# Patient Record
Sex: MALE | Race: WHITE | NOT HISPANIC OR LATINO | Employment: FULL TIME | URBAN - METROPOLITAN AREA
[De-identification: names, ages, dates, MRNs, and addresses within clinical notes are randomized per-mention and may not be internally consistent; named-entity substitution may affect disease eponyms.]

---

## 2017-03-24 ENCOUNTER — OFFICE VISIT (OUTPATIENT)
Dept: INTERNAL MEDICINE | Facility: CLINIC | Age: 37
End: 2017-03-24

## 2017-03-24 VITALS
WEIGHT: 300 LBS | DIASTOLIC BLOOD PRESSURE: 80 MMHG | HEART RATE: 90 BPM | SYSTOLIC BLOOD PRESSURE: 128 MMHG | OXYGEN SATURATION: 96 % | BODY MASS INDEX: 47.09 KG/M2 | HEIGHT: 67 IN

## 2017-03-24 DIAGNOSIS — Z00.00 ANNUAL PHYSICAL EXAM: Primary | ICD-10-CM

## 2017-03-24 DIAGNOSIS — E66.01 MORBID OBESITY, UNSPECIFIED OBESITY TYPE (HCC): ICD-10-CM

## 2017-03-24 DIAGNOSIS — K62.5 RECTAL BLEEDING: ICD-10-CM

## 2017-03-24 DIAGNOSIS — R10.13 EPIGASTRIC PAIN: ICD-10-CM

## 2017-03-24 LAB — HEMOCCULT STL QL IA: NEGATIVE

## 2017-03-24 PROCEDURE — 93000 ELECTROCARDIOGRAM COMPLETE: CPT | Performed by: NURSE PRACTITIONER

## 2017-03-24 PROCEDURE — 82274 ASSAY TEST FOR BLOOD FECAL: CPT | Performed by: NURSE PRACTITIONER

## 2017-03-24 PROCEDURE — 99385 PREV VISIT NEW AGE 18-39: CPT | Performed by: NURSE PRACTITIONER

## 2017-03-24 NOTE — PATIENT INSTRUCTIONS
"Low-Fat Diet for Pancreatitis or Gallbladder Conditions  A low-fat diet can be helpful if you have pancreatitis or a gallbladder condition. With these conditions, your pancreas and gallbladder have trouble digesting fats. A healthy eating plan with less fat will help rest your pancreas and gallbladder and reduce your symptoms.  WHAT DO I NEED TO KNOW ABOUT THIS DIET?  · Eat a low-fat diet.    Reduce your fat intake to less than 20-30% of your total daily calories. This is less than 50-60 g of fat per day.    Remember that you need some fat in your diet. Ask your dietician what your daily goal should be.    Choose nonfat and low-fat healthy foods. Look for the words \"nonfat,\" \"low fat,\" or \"fat free.\"    As a guide, look on the label and choose foods with less than 3 g of fat per serving. Eat only one serving.  · Avoid alcohol.  · Do not smoke. If you need help quitting, talk with your health care provider.  · Eat small frequent meals instead of three large heavy meals.  WHAT FOODS CAN I EAT?  Grains  Include healthy grains and starches such as potatoes, wheat bread, fiber-rich cereal, and brown rice. Choose whole grain options whenever possible. In adults, whole grains should account for 45-65% of your daily calories.   Fruits and Vegetables  Eat plenty of fruits and vegetables. Fresh fruits and vegetables add fiber to your diet.  Meats and Other Protein Sources  Eat lean meat such as chicken and pork. Trim any fat off of meat before cooking it. Eggs, fish, and beans are other sources of protein. In adults, these foods should account for 10-35% of your daily calories.  Dairy  Choose low-fat milk and dairy options. Dairy includes fat and protein, as well as calcium.   Fats and Oils  Limit high-fat foods such as fried foods, sweets, baked goods, sugary drinks.   Other  Creamy sauces and condiments, such as mayonnaise, can add extra fat. Think about whether or not you need to use them, or use smaller amounts or low fat " options.  WHAT FOODS ARE NOT RECOMMENDED?  · High fat foods, such as:    Baked goods.    Ice cream.    [place] citizen toast.    Sweet rolls.    Pizza.    Cheese bread.    Foods covered with batter, butter, creamy sauces, or cheese.    Fried foods.    Sugary drinks and desserts.  · Foods that cause gas or bloating     This information is not intended to replace advice given to you by your health care provider. Make sure you discuss any questions you have with your health care provider.     Document Released: 12/23/2014 Document Reviewed: 12/23/2014  [employer] Interactive Patient Education ©2016 [employer]

## 2017-03-24 NOTE — PROGRESS NOTES
Subjective   Greg Adkins is a 37 y.o. male.     HPI Comments: Well Adult Physical   Patient here for a comprehensive physical exam.The patient reports problems - rectal bleeding and chest pain (epigastric area)    Do you take any herbs or supplements that were not prescribed by a doctor? no   Are you taking calcium supplements? no   Are you taking aspirin daily? no     History:  Patient receives prostate care outside our clinic  Date last prostate exam: n/a  Date last PSA: n/a    He is  (full time). 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 He does not have routine exercise regimen. He does walk a lot with work. He did recently join [employer]. His last dental exam several years ago. His last eye exam several years ago.     Rectal Bleeding   This is a chronic problem. The current episode started more than 1 year ago. The problem occurs intermittently. The problem has been waxing and waning (last 1-2 days ). Associated symptoms include arthralgias (bilateral knees, intermittent ) and chest pain. Pertinent negatives include no abdominal pain, chills, congestion, coughing, fatigue, fever, headaches, joint swelling, myalgias, nausea, neck pain, numbness, rash, sore throat, vomiting or weakness. Nothing aggravates the symptoms. He has tried nothing for the symptoms.   Chest Pain    This is a new problem. The current episode started more than 1 year ago. The problem occurs intermittently. The problem has been waxing and waning. The pain is present in the epigastric region. The pain is at a severity of 0/10 (worst 6/10). The patient is experiencing no pain. The quality of the pain is described as pressure. The pain does not radiate. Associated symptoms include dizziness. Pertinent negatives include no abdominal pain, back pain, cough, fever, headaches, malaise/fatigue, nausea, numbness, palpitations, shortness of breath, vomiting or weakness. The pain is aggravated by food. The treatment provided significant relief.  Risk factors include male gender and obesity.   His past medical history is significant for anxiety/panic attacks and hyperlipidemia.   Pertinent negatives for past medical history include no MI and no seizures.   His family medical history is significant for heart disease and stroke.   Pertinent negatives for family medical history include: no early MI and no sudden death.        The following portions of the patient's history were reviewed and updated as appropriate: allergies, current medications, past family history, past medical history, past social history, past surgical history and problem list.    Review of Systems   Constitutional: Negative for activity change, appetite change, chills, fatigue, fever, malaise/fatigue and unexpected weight change.        Hx of dyslexia    HENT: Positive for mouth sores. Negative for congestion, ear discharge, ear pain, hearing loss, nosebleeds, postnasal drip, rhinorrhea, sinus pressure, sneezing, sore throat, tinnitus and voice change.         Bleeding gums    Eyes: Positive for visual disturbance (wears glasses).   Respiratory: Negative for cough, chest tightness, shortness of breath and wheezing.    Cardiovascular: Positive for chest pain. Negative for palpitations and leg swelling.   Gastrointestinal: Positive for hematochezia. Negative for abdominal distention, abdominal pain, anal bleeding, blood in stool (rectal bleeding, intermittent when wiping ), constipation, diarrhea, nausea and vomiting.   Endocrine: Negative for cold intolerance, heat intolerance, polydipsia, polyphagia and polyuria.   Genitourinary: Negative for difficulty urinating, discharge, frequency, hematuria, penile pain, penile swelling, scrotal swelling, testicular pain and urgency.        Hx of kidney stones    Musculoskeletal: Positive for arthralgias (bilateral knees, intermittent ). Negative for back pain, gait problem, joint swelling, myalgias, neck pain and neck stiffness.   Skin: Negative for  color change, pallor and rash.        NEGATIVE BREAST MASS, BREAST PAIN, NIPPLE DISCHARGE, SKIN CHANGES   Neurological: Positive for dizziness and light-headedness (happened a few days ago, lasted about 4 hours, he fell asleep). Negative for tremors, seizures, syncope, speech difficulty, weakness, numbness and headaches.   Hematological: Negative for adenopathy. Does not bruise/bleed easily.   Psychiatric/Behavioral: Negative for confusion, decreased concentration, dysphoric mood, sleep disturbance and suicidal ideas. The patient is not nervous/anxious.        Objective   Physical Exam   Constitutional: He is oriented to person, place, and time. He appears well-developed.   HENT:   Head: Normocephalic.   Right Ear: Tympanic membrane, external ear and ear canal normal.   Left Ear: Tympanic membrane, external ear and ear canal normal.   Nose: Nose normal. Right sinus exhibits no maxillary sinus tenderness and no frontal sinus tenderness. Left sinus exhibits no maxillary sinus tenderness and no frontal sinus tenderness.   Mouth/Throat: Uvula is midline, oropharynx is clear and moist and mucous membranes are normal.   Eyes: EOM are normal. Pupils are equal, round, and reactive to light.   Neck: Normal range of motion. Carotid bruit is not present. No tracheal deviation present. No thyromegaly present.   Cardiovascular: Normal rate, regular rhythm, normal heart sounds, intact distal pulses and normal pulses.  Exam reveals no gallop and no friction rub.    No murmur heard.  Pulmonary/Chest: No respiratory distress. He has no wheezes. He has no rales. He exhibits no tenderness.   Abdominal: Soft. Bowel sounds are normal. He exhibits no distension. There is no tenderness. Hernia confirmed negative in the right inguinal area and confirmed negative in the left inguinal area.   Genitourinary: Prostate normal, testes normal and penis normal. Rectal exam shows external hemorrhoid. Rectal exam shows no tenderness and guaiac  negative stool.   Musculoskeletal: He exhibits no edema, tenderness or deformity.   (-) SLR    Lymphadenopathy:     He has no cervical adenopathy. No inguinal adenopathy noted on the right or left side.   Neurological: He is alert and oriented to person, place, and time. He displays normal reflexes. No cranial nerve deficit. He exhibits normal muscle tone. Coordination normal.   Reflex Scores:       Bicep reflexes are 2+ on the right side and 2+ on the left side.       Brachioradialis reflexes are 2+ on the right side and 2+ on the left side.       Patellar reflexes are 2+ on the right side and 2+ on the left side.  Skin: Skin is warm. No erythema. No pallor.   Psychiatric: He has a normal mood and affect. His behavior is normal. Judgment and thought content normal.       Assessment/Plan   Greg was seen today for annual exam, rectal bleeding and chest pain.    Diagnoses and all orders for this visit:    Annual physical exam  -     CBC Auto Differential; Future  -     Comprehensive Metabolic Panel; Future  -     Lipid Panel; Future  -     Urinalysis With / Microscopic If Indicated; Future    Rectal bleeding  Comments:  ? r/t hemorrhoids   Orders:  -     POC FECAL OCCULT BLOOD BY IMMUNOASSAY    Epigastric pain  Comments:  gerd diet; may take otc zantac; will check US   Orders:  -     ECG 12 Lead  -     US Gallbladder    Morbid obesity, unspecified obesity type  Comments:  diet and exercise    He will return for fasting labs.           ECG 12 Lead  Date/Time: 3/24/2017 12:26 PM  Performed by: DANIEL VERA  Authorized by: DANIEL VERA   Rhythm: sinus rhythm  Rate: normal  Conduction: conduction normal  ST Segments: ST segments normal  T Waves: T waves normal  QRS axis: normal  Clinical impression: normal ECG

## 2017-03-31 ENCOUNTER — HOSPITAL ENCOUNTER (OUTPATIENT)
Dept: ULTRASOUND IMAGING | Facility: HOSPITAL | Age: 37
Discharge: HOME | End: 2017-03-31
Admitting: NURSE PRACTITIONER

## 2017-03-31 ENCOUNTER — LAB (OUTPATIENT)
Dept: LAB | Facility: HOSPITAL | Age: 37
End: 2017-03-31

## 2017-03-31 DIAGNOSIS — Z00.00 ANNUAL PHYSICAL EXAM: ICD-10-CM

## 2017-03-31 LAB
ALBUMIN SERPL-MCNC: 4.5 G/DL (ref 3.5–5.2)
ALBUMIN/GLOB SERPL: 1.6 G/DL
ALP SERPL-CCNC: 69 U/L (ref 39–117)
ALT SERPL W P-5'-P-CCNC: 51 U/L (ref 1–41)
ANION GAP SERPL CALCULATED.3IONS-SCNC: 12.9 MMOL/L
AST SERPL-CCNC: 42 U/L (ref 1–40)
BASOPHILS # BLD AUTO: 0.02 10*3/MM3 (ref 0–0.2)
BASOPHILS NFR BLD AUTO: 0.3 % (ref 0–1.5)
BILIRUB SERPL-MCNC: 0.5 MG/DL (ref 0.1–1.2)
BILIRUB UR QL STRIP: NEGATIVE
BUN BLD-MCNC: 16 MG/DL (ref 6–20)
BUN/CREAT SERPL: 16.7 (ref 7–25)
CALCIUM SPEC-SCNC: 9.6 MG/DL (ref 8.6–10.5)
CHLORIDE SERPL-SCNC: 103 MMOL/L (ref 98–107)
CHOLEST SERPL-MCNC: 198 MG/DL (ref 0–200)
CLARITY UR: CLEAR
CO2 SERPL-SCNC: 27.1 MMOL/L (ref 22–29)
COLOR UR: YELLOW
CREAT BLD-MCNC: 0.96 MG/DL (ref 0.76–1.27)
DEPRECATED RDW RBC AUTO: 46.1 FL (ref 37–54)
EOSINOPHIL # BLD AUTO: 0.11 10*3/MM3 (ref 0–0.7)
EOSINOPHIL NFR BLD AUTO: 1.6 % (ref 0.3–6.2)
ERYTHROCYTE [DISTWIDTH] IN BLOOD BY AUTOMATED COUNT: 13.9 % (ref 11.5–14.5)
GFR SERPL CREATININE-BSD FRML MDRD: 87 ML/MIN/1.73
GLOBULIN UR ELPH-MCNC: 2.9 GM/DL
GLUCOSE BLD-MCNC: 95 MG/DL (ref 65–99)
GLUCOSE UR STRIP-MCNC: NEGATIVE MG/DL
HCT VFR BLD AUTO: 44 % (ref 40.4–52.2)
HDLC SERPL-MCNC: 40 MG/DL (ref 40–60)
HGB BLD-MCNC: 14.8 G/DL (ref 13.7–17.6)
HGB UR QL STRIP.AUTO: NEGATIVE
IMM GRANULOCYTES # BLD: 0.02 10*3/MM3 (ref 0–0.03)
IMM GRANULOCYTES NFR BLD: 0.3 % (ref 0–0.5)
KETONES UR QL STRIP: NEGATIVE
LDLC SERPL CALC-MCNC: 130 MG/DL (ref 0–100)
LDLC/HDLC SERPL: 3.26 {RATIO}
LEUKOCYTE ESTERASE UR QL STRIP.AUTO: NEGATIVE
LYMPHOCYTES # BLD AUTO: 1.76 10*3/MM3 (ref 0.9–4.8)
LYMPHOCYTES NFR BLD AUTO: 25.5 % (ref 19.6–45.3)
MCH RBC QN AUTO: 30.7 PG (ref 27–32.7)
MCHC RBC AUTO-ENTMCNC: 33.6 G/DL (ref 32.6–36.4)
MCV RBC AUTO: 91.3 FL (ref 79.8–96.2)
MONOCYTES # BLD AUTO: 0.64 10*3/MM3 (ref 0.2–1.2)
MONOCYTES NFR BLD AUTO: 9.3 % (ref 5–12)
NEUTROPHILS # BLD AUTO: 4.36 10*3/MM3 (ref 1.9–8.1)
NEUTROPHILS NFR BLD AUTO: 63 % (ref 42.7–76)
NITRITE UR QL STRIP: NEGATIVE
PH UR STRIP.AUTO: 6.5 [PH] (ref 5–8)
PLATELET # BLD AUTO: 198 10*3/MM3 (ref 140–500)
PMV BLD AUTO: 9.9 FL (ref 6–12)
POTASSIUM BLD-SCNC: 4.5 MMOL/L (ref 3.5–5.2)
PROT SERPL-MCNC: 7.4 G/DL (ref 6–8.5)
PROT UR QL STRIP: NEGATIVE
RBC # BLD AUTO: 4.82 10*6/MM3 (ref 4.6–6)
SODIUM BLD-SCNC: 143 MMOL/L (ref 136–145)
SP GR UR STRIP: 1.03 (ref 1–1.03)
TRIGL SERPL-MCNC: 139 MG/DL (ref 0–150)
UROBILINOGEN UR QL STRIP: NORMAL
VLDLC SERPL-MCNC: 27.8 MG/DL (ref 5–40)
WBC NRBC COR # BLD: 6.91 10*3/MM3 (ref 4.5–10.7)

## 2017-03-31 PROCEDURE — 76705 ECHO EXAM OF ABDOMEN: CPT

## 2017-03-31 PROCEDURE — 81003 URINALYSIS AUTO W/O SCOPE: CPT

## 2017-03-31 PROCEDURE — 80053 COMPREHEN METABOLIC PANEL: CPT

## 2017-03-31 PROCEDURE — 80061 LIPID PANEL: CPT

## 2017-03-31 PROCEDURE — 85025 COMPLETE CBC W/AUTO DIFF WBC: CPT

## 2017-03-31 PROCEDURE — 36415 COLL VENOUS BLD VENIPUNCTURE: CPT

## 2017-04-28 ENCOUNTER — TELEPHONE (OUTPATIENT)
Dept: INTERNAL MEDICINE | Facility: CLINIC | Age: 37
End: 2017-04-28

## 2017-04-28 ENCOUNTER — HOSPITAL ENCOUNTER (OUTPATIENT)
Dept: GENERAL RADIOLOGY | Facility: HOSPITAL | Age: 37
Discharge: HOME | End: 2017-04-28
Admitting: NURSE PRACTITIONER

## 2017-04-28 ENCOUNTER — OFFICE VISIT (OUTPATIENT)
Dept: INTERNAL MEDICINE | Facility: CLINIC | Age: 37
End: 2017-04-28

## 2017-04-28 VITALS
HEART RATE: 88 BPM | BODY MASS INDEX: 48.18 KG/M2 | SYSTOLIC BLOOD PRESSURE: 122 MMHG | HEIGHT: 67 IN | OXYGEN SATURATION: 95 % | WEIGHT: 307 LBS | DIASTOLIC BLOOD PRESSURE: 88 MMHG

## 2017-04-28 DIAGNOSIS — M54.12 CERVICAL RADICULAR PAIN: ICD-10-CM

## 2017-04-28 DIAGNOSIS — K21.00 GASTROESOPHAGEAL REFLUX DISEASE WITH ESOPHAGITIS: ICD-10-CM

## 2017-04-28 DIAGNOSIS — K62.5 RECTAL BLEEDING: Primary | ICD-10-CM

## 2017-04-28 PROCEDURE — 72050 X-RAY EXAM NECK SPINE 4/5VWS: CPT

## 2017-04-28 PROCEDURE — 99213 OFFICE O/P EST LOW 20 MIN: CPT | Performed by: NURSE PRACTITIONER

## 2017-04-28 NOTE — PROGRESS NOTES
Subjective   Greg Adkins is a 37 y.o. male.     Rectal Bleeding   This is a new problem. The problem has been resolved. Associated symptoms include numbness (intermittent in right hand, right middle finger ). Pertinent negatives include no abdominal pain, chest pain, coughing, fatigue, fever, nausea, neck pain or vomiting.   Abdominal Pain   This is a recurrent problem. The current episode started more than 1 month ago. The problem occurs intermittently. The problem has been gradually improving. The pain is located in the epigastric region. The pain is at a severity of 0/10. The patient is experiencing no pain. The quality of the pain is burning. The abdominal pain does not radiate. Associated symptoms include hematochezia. Pertinent negatives include no diarrhea, fever, nausea or vomiting. The pain is aggravated by eating. Relieved by: removing caffiene avoid spicy foods  He has tried proton pump inhibitors for the symptoms. The treatment provided moderate relief. Prior workup: mathieu riddle         The following portions of the patient's history were reviewed and updated as appropriate: allergies, current medications and problem list.    Review of Systems   Constitutional: Negative for activity change, appetite change, fatigue and fever.   Respiratory: Negative for cough.    Cardiovascular: Negative for chest pain.   Gastrointestinal: Positive for hematochezia. Negative for abdominal distention, abdominal pain, blood in stool (resolved ), diarrhea, nausea, rectal pain and vomiting.        C/o reflux    Musculoskeletal: Negative for neck pain and neck stiffness.   Neurological: Positive for numbness (intermittent in right hand, right middle finger ).       Objective   Physical Exam   Constitutional: He is oriented to person, place, and time. He appears well-developed and well-nourished.   HENT:   Head: Normocephalic.   Nose: Nose normal.   Neck: Full passive range of motion without pain. Muscular tenderness  (mild ) present. No spinous process tenderness present. Carotid bruit is not present. Normal range of motion present.   Cardiovascular: Regular rhythm and normal heart sounds.  Exam reveals no S3 and no S4.    No murmur heard.  Pulmonary/Chest: Effort normal and breath sounds normal. He has no decreased breath sounds. He has no wheezes. He has no rhonchi. He has no rales.   Musculoskeletal: He exhibits no edema.        Right shoulder: He exhibits tenderness (mild ). He exhibits normal range of motion.        Cervical back: He exhibits normal range of motion, no tenderness and no pain.   Negative phalen's test     Neurological: He is alert and oriented to person, place, and time. Gait normal.   Reflex Scores:       Bicep reflexes are 2+ on the right side and 2+ on the left side.       Brachioradialis reflexes are 2+ on the right side and 2+ on the left side.  Skin: Skin is warm and dry.   Psychiatric: He has a normal mood and affect.       Assessment/Plan   Greg was seen today for rectal bleeding and abdominal pain.    Diagnoses and all orders for this visit:    Rectal bleeding  Comments:  resolved     Gastroesophageal reflux disease with esophagitis  Comments:  may use TUMS or zantac prn; avoid spicy acidic foods; small frequent meals    Cervical radicular pain  Comments:  will check c-spine   Orders:  -     Cancel: XR Spine Cervical Complete 4 or 5 View  -     XR Spine Cervical Complete 4 or 5 View    I reviewed recent labs and us of gallbladder with patient. We discussed potential HIDA scan he would like to wait on that. He would like to continue with diet modification.

## 2017-04-28 NOTE — TELEPHONE ENCOUNTER
I called patient to inform him of xray results of c-spine (mild degenerative changes/ ? Spasm). He will return if worsening of symptoms, may need to consider MRI.

## 2017-10-27 ENCOUNTER — OFFICE VISIT (OUTPATIENT)
Dept: INTERNAL MEDICINE | Facility: CLINIC | Age: 37
End: 2017-10-27

## 2017-10-27 VITALS
WEIGHT: 301 LBS | DIASTOLIC BLOOD PRESSURE: 92 MMHG | BODY MASS INDEX: 47.14 KG/M2 | HEART RATE: 77 BPM | SYSTOLIC BLOOD PRESSURE: 136 MMHG | OXYGEN SATURATION: 97 %

## 2017-10-27 DIAGNOSIS — M54.12 CERVICAL RADICULAR PAIN: Primary | ICD-10-CM

## 2017-10-27 DIAGNOSIS — R20.0 NUMBNESS IN BOTH HANDS: ICD-10-CM

## 2017-10-27 DIAGNOSIS — R03.0 ELEVATED BLOOD PRESSURE READING: ICD-10-CM

## 2017-10-27 PROCEDURE — 99214 OFFICE O/P EST MOD 30 MIN: CPT | Performed by: NURSE PRACTITIONER

## 2017-10-27 NOTE — PATIENT INSTRUCTIONS
"DASH Eating Plan  DASH stands for \"Dietary Approaches to Stop Hypertension.\" The DASH eating plan is a healthy eating plan that has been shown to reduce high blood pressure (hypertension). Additional health benefits may include reducing the risk of type 2 diabetes mellitus, heart disease, and stroke. The DASH eating plan may also help with weight loss.  WHAT DO I NEED TO KNOW ABOUT THE DASH EATING PLAN?  For the DASH eating plan, you will follow these general guidelines:  · Choose foods with less than 150 milligrams of sodium per serving (as listed on the food label).  · Use salt-free seasonings or herbs instead of table salt or sea salt.  · Check with your health care provider or pharmacist before using salt substitutes.  · Eat lower-sodium products. These are often labeled as \"low-sodium\" or \"no salt added.\"  · Eat fresh foods. Avoid eating a lot of canned foods.  · Eat more vegetables, fruits, and low-fat dairy products.  · Choose whole grains. Look for the word \"whole\" as the first word in the ingredient list.  · Choose fish and skinless chicken or turkey more often than red meat. Limit fish, poultry, and meat to 6 oz (170 g) each day.  · Limit sweets, desserts, sugars, and sugary drinks.  · Choose heart-healthy fats.  · Eat more home-cooked food and less restaurant, buffet, and fast food.  · Limit fried foods.  · Do not hernandez foods. Cook foods using methods such as baking, boiling, grilling, and broiling instead.  · When eating at a restaurant, ask that your food be prepared with less salt, or no salt if possible.  WHAT FOODS CAN I EAT?  Seek help from a dietitian for individual calorie needs.  Grains  Whole grain or whole wheat bread. Brown rice. Whole grain or whole wheat pasta. Quinoa, bulgur, and whole grain cereals. Low-sodium cereals. Corn or whole wheat flour tortillas. Whole grain cornbread. Whole grain crackers. Low-sodium crackers.  Vegetables  Fresh or frozen vegetables (raw, steamed, roasted, or " grilled). Low-sodium or reduced-sodium tomato and vegetable juices. Low-sodium or reduced-sodium tomato sauce and paste. Low-sodium or reduced-sodium canned vegetables.   Fruits  All fresh, canned (in natural juice), or frozen fruits.  Meat and Other Protein Products  Ground beef (85% or leaner), grass-fed beef, or beef trimmed of fat. Skinless chicken or turkey. Ground chicken or turkey. Pork trimmed of fat. All fish and seafood. Eggs. Dried beans, peas, or lentils. Unsalted nuts and seeds. Unsalted canned beans.  Dairy  Low-fat dairy products, such as skim or 1% milk, 2% or reduced-fat cheeses, low-fat ricotta or cottage cheese, or plain low-fat yogurt. Low-sodium or reduced-sodium cheeses.  Fats and Oils  Tub margarines without trans fats. Light or reduced-fat mayonnaise and salad dressings (reduced sodium). Avocado. Safflower, olive, or canola oils. Natural peanut or almond butter.  Other  Unsalted popcorn and pretzels.  The items listed above may not be a complete list of recommended foods or beverages. Contact your dietitian for more options.  WHAT FOODS ARE NOT RECOMMENDED?  Grains  White bread. White pasta. White rice. Refined cornbread. Bagels and croissants. Crackers that contain trans fat.  Vegetables  Creamed or fried vegetables. Vegetables in a cheese sauce. Regular canned vegetables. Regular canned tomato sauce and paste. Regular tomato and vegetable juices.  Fruits  Canned fruit in light or heavy syrup. Fruit juice.  Meat and Other Protein Products  Fatty cuts of meat. Ribs, chicken wings, castro, sausage, bologna, salami, chitterlings, fatback, hot dogs, bratwurst, and packaged luncheon meats. Salted nuts and seeds. Canned beans with salt.  Dairy  Whole or 2% milk, cream, half-and-half, and cream cheese. Whole-fat or sweetened yogurt. Full-fat cheeses or blue cheese. Nondairy creamers and whipped toppings. Processed cheese, cheese spreads, or cheese curds.  Condiments  Onion and garlic salt, seasoned  salt, table salt, and sea salt. Canned and packaged gravies. Worcestershire sauce. Tartar sauce. Barbecue sauce. Teriyaki sauce. Soy sauce, including reduced sodium. Steak sauce. Fish sauce. Oyster sauce. Cocktail sauce. Horseradish. Ketchup and mustard. Meat flavorings and tenderizers. Bouillon cubes. Hot sauce. Tabasco sauce. Marinades. Taco seasonings. Relishes.  Fats and Oils  Butter, stick margarine, lard, shortening, ghee, and castro fat. Coconut, palm kernel, or palm oils. Regular salad dressings.  Other  Pickles and olives. Salted popcorn and pretzels.  The items listed above may not be a complete list of foods and beverages to avoid. Contact your dietitian for more information.  WHERE CAN I FIND MORE INFORMATION?  [employer] [place]: www.nhlbi.nih.gov/health/health-topics/topics/dash/     This information is not intended to replace advice given to you by your health care provider. Make sure you discuss any questions you have with your health care provider.     Document Released: 12/06/2012 Document Revised: 04/10/2017 Document Reviewed: 10/22/2014  [employer] Interactive Patient Education ©2017 [employer]

## 2017-10-27 NOTE — PROGRESS NOTES
Subjective   Greg Adkins is a 37 y.o. male.     HPI Comments: He reports his numbness and tingling in hands has gotten worst in the last month. He had cervical spine xray performed in 4/2017.      Neck Pain    This is a new problem. The pain is associated with nothing. The quality of the pain is described as stabbing. The pain is at a severity of 0/10. The patient is experiencing no pain. The symptoms are aggravated by twisting. Associated symptoms include numbness (in bilateral hands ). Pertinent negatives include no chest pain, fever, headaches, pain with swallowing, trouble swallowing or visual change.        The following portions of the patient's history were reviewed and updated as appropriate: allergies, current medications, past family history, past medical history, past social history, past surgical history and problem list.    Review of Systems   Constitutional: Negative for activity change, appetite change, fatigue and fever.   HENT: Negative for trouble swallowing.    Respiratory: Negative for cough, shortness of breath and wheezing.    Cardiovascular: Negative for chest pain, palpitations and leg swelling.   Musculoskeletal: Positive for neck pain (radiate to right hand , third digit ).   Neurological: Positive for numbness (in bilateral hands ). Negative for dizziness, tremors and headaches.       Objective   Physical Exam   Constitutional: He is oriented to person, place, and time. He appears well-developed and well-nourished.   HENT:   Head: Normocephalic.   Nose: Nose normal.   Neck: Full passive range of motion without pain. No spinous process tenderness and no muscular tenderness present. Carotid bruit is not present. No thyroid mass and no thyromegaly present.   Cardiovascular: Regular rhythm and normal heart sounds.  Exam reveals no S3 and no S4.    No murmur heard.  Repeat bp left arm 138/88  No pedal edema   Pulmonary/Chest: Effort normal and breath sounds normal. He has no decreased  breath sounds. He has no wheezes. He has no rhonchi. He has no rales.   Musculoskeletal: He exhibits no edema.        Right hand: He exhibits tenderness. He exhibits normal range of motion, normal capillary refill and no swelling. Normal sensation noted.        Left hand: He exhibits tenderness. He exhibits normal range of motion, normal capillary refill and no swelling. Normal sensation noted.   Neurological: He is alert and oriented to person, place, and time. Gait normal.   (+) phalen and tinel sign    Skin: Skin is warm and dry.   Psychiatric: He has a normal mood and affect.       Assessment/Plan   Greg was seen today for neck pain and numbness.    Diagnoses and all orders for this visit:    Cervical radicular pain  -     Ambulatory Referral to Hand Surgery  -     MRI Cervical Spine Without Contrast; Future    Numbness in both hands  Comments:  will obtain emg and refer  to hand specialist ; will have him apply splints; left worst than right  Orders:  -     EMG & Nerve Conduction Test; Future    Elevated blood pressure reading  Comments:  goal of bp 140/90; low salt diet and exercise     I reviewed recent c spine results with patient. Secondary to persistent hand pain/numbness will obtain further imaging and test and refer to hand specialist.  He will monitor blood pressure and bring readings to next office visit. He will work on low salt diet, exercise and weight loss.

## 2017-11-10 ENCOUNTER — HOSPITAL ENCOUNTER (OUTPATIENT)
Dept: INFUSION THERAPY | Facility: HOSPITAL | Age: 37
Discharge: HOME | End: 2017-11-10
Attending: PHYSICAL MEDICINE & REHABILITATION | Admitting: PHYSICAL MEDICINE & REHABILITATION

## 2017-11-10 DIAGNOSIS — R20.0 NUMBNESS IN BOTH HANDS: ICD-10-CM

## 2017-11-10 PROCEDURE — 95886 MUSC TEST DONE W/N TEST COMP: CPT

## 2017-11-10 PROCEDURE — 95909 NRV CNDJ TST 5-6 STUDIES: CPT

## 2017-11-10 NOTE — PROCEDURES
HISTORY:      The patient is a 37-year-old left hand dominant, non-diabetic male who presents with a complaint of numbness and tingling of both hands (left > right).  He states that he has been experiencing the symptoms for several years.  He gives a 2-month history of an increase in severity of symptoms.     The patient denies neck pain.          I.  NERVE CONDUCTION STUDIES:       The distal latency of the left median motor nerve is 6.4 ms (upper limit of normal 4.3 ms).  The amplitude of evoked response is 4.3 mV.  The conduction velocity is 42 m/sec.       The distal latency of left median sensory nerve at 14 cm is 4.8 ms (upper limit of normal 3.6 ms).  The amplitude of evoked response is 10 microvolts (normal > 20 microvolts).       The distal latency of the left median sensory at 10 cm is 4.2 ms; the distal latency of the left radial sensory nerve at 10 cm is 2.1 ms (normal difference less than or equal to 0.4 ms).       The distal latency of the right median motor nerve is 6.0 ms (upper limit of normal 4.3 ms).  The amplitude of evoked response is 4.3 mV.  The conduction velocity is 43 m/sec.       The distal latency of the right median sensory nerve at 14 cm is 4.7 ms (upper limit of normal 3.6 ms). The amplitude of evoked response is 13 microvolts (normal > 20 microvolts).       The distal latency of the right median sensory nerve at 10 cm is 4.0 ms; the distal latency of the right radial sensory nerve at 10 cm is 2.5 ms (normal difference less than or equal to 0.4 ms).       II.  ELECTROMYOGRAPHY:       Electromyography was performed on the following muscles of the left upper extremity using a monopolar needle: Deltoid, biceps, brachioradialis, flexor carpi radialis, flexor carpi ulnaris, and abductor pollicis brevis.      There were no changes in insertional activity. Denervation potentials were present in the left abductor pollicis brevis.       The motor unit action potentials were of normal height and  duration. The recruitment patterns were normal.       III.  IMPRESSION:      1. The study shows evidence of  moderate to severe entrapment of the median nerve at the carpal ligament bilaterally (left > right).    2. There is no electrophysiologic evidence of a cervical radiculopathy or generalized peripheral neuropathy.      Santa Le MD  11/10/2017

## 2017-11-11 ENCOUNTER — HOSPITAL ENCOUNTER (OUTPATIENT)
Dept: MRI IMAGING | Facility: HOSPITAL | Age: 37
Discharge: HOME | End: 2017-11-11
Admitting: NURSE PRACTITIONER

## 2017-11-11 DIAGNOSIS — M54.12 CERVICAL RADICULAR PAIN: ICD-10-CM

## 2017-11-11 PROCEDURE — 72141 MRI NECK SPINE W/O DYE: CPT

## 2017-11-16 ENCOUNTER — TELEPHONE (OUTPATIENT)
Dept: INTERNAL MEDICINE | Facility: CLINIC | Age: 37
End: 2017-11-16

## 2017-11-16 NOTE — TELEPHONE ENCOUNTER
Patient returned call and I informed him of emg study report (moderate to severe median entrapment of bilateral extremites, worst left). His cervical spine was normal. He is due to see Dr Mcmillan tomorrow. I informed him that I we have faxed his reports to his office today.

## 2017-11-16 NOTE — TELEPHONE ENCOUNTER
I called patient to discuss nerve conduction test and mri findings. There was no answers so I left a message for patient to return call.

## 2017-11-16 NOTE — TELEPHONE ENCOUNTER
----- Message from Rhiannon Unger sent at 11/16/2017  3:14 PM EST -----  Contact: pt - Alessandra - RE: return call  Pt calling and would like a return call regarding results. Could you please call pt to discuss? Please advise. Thanks        Pt : [phone]

## 2017-12-08 ENCOUNTER — HOSPITAL ENCOUNTER (OUTPATIENT)
Dept: CT IMAGING | Facility: HOSPITAL | Age: 37
Discharge: HOME | End: 2017-12-08

## 2017-12-08 ENCOUNTER — HOSPITAL ENCOUNTER (OUTPATIENT)
Dept: CARDIOLOGY | Facility: HOSPITAL | Age: 37
Discharge: HOME | End: 2017-12-08
Attending: PLASTIC SURGERY | Admitting: PLASTIC SURGERY

## 2017-12-08 ENCOUNTER — TRANSCRIBE ORDERS (OUTPATIENT)
Dept: ADMINISTRATIVE | Facility: HOSPITAL | Age: 37
End: 2017-12-08

## 2017-12-08 ENCOUNTER — OFFICE VISIT (OUTPATIENT)
Dept: INTERNAL MEDICINE | Facility: CLINIC | Age: 37
End: 2017-12-08

## 2017-12-08 VITALS
BODY MASS INDEX: 46.36 KG/M2 | HEART RATE: 99 BPM | OXYGEN SATURATION: 95 % | TEMPERATURE: 98 F | DIASTOLIC BLOOD PRESSURE: 80 MMHG | WEIGHT: 296 LBS | SYSTOLIC BLOOD PRESSURE: 138 MMHG

## 2017-12-08 DIAGNOSIS — Z01.818 PRE-OP TESTING: ICD-10-CM

## 2017-12-08 DIAGNOSIS — R10.32 LEFT LOWER QUADRANT PAIN: ICD-10-CM

## 2017-12-08 DIAGNOSIS — Z01.818 PRE-OP TESTING: Primary | ICD-10-CM

## 2017-12-08 DIAGNOSIS — R20.0 NUMBNESS IN BOTH HANDS: Primary | ICD-10-CM

## 2017-12-08 DIAGNOSIS — G56.02 CARPAL TUNNEL SYNDROME OF LEFT WRIST: ICD-10-CM

## 2017-12-08 DIAGNOSIS — G56.03 BILATERAL CARPAL TUNNEL SYNDROME: ICD-10-CM

## 2017-12-08 LAB
BACTERIA UR QL AUTO: ABNORMAL /HPF
BILIRUB UR QL STRIP: NEGATIVE
BUN SERPL-MCNC: 13 MG/DL (ref 6–20)
BUN/CREAT SERPL: 11.2 (ref 7–25)
CALCIUM SERPL-MCNC: 9.9 MG/DL (ref 8.6–10.5)
CHLORIDE SERPL-SCNC: 101 MMOL/L (ref 98–107)
CLARITY UR: CLEAR
CO2 SERPL-SCNC: 27.7 MMOL/L (ref 22–29)
COLOR UR: ABNORMAL
CREAT SERPL-MCNC: 1.16 MG/DL (ref 0.76–1.27)
DEPRECATED RDW RBC AUTO: 44.1 FL (ref 37–54)
ERYTHROCYTE [DISTWIDTH] IN BLOOD BY AUTOMATED COUNT: 13.9 % (ref 11.5–15)
GLUCOSE SERPL-MCNC: 102 MG/DL (ref 65–99)
GLUCOSE UR STRIP-MCNC: NEGATIVE MG/DL
HCT VFR BLD AUTO: 44.3 % (ref 40.1–51)
HGB BLD-MCNC: 15.4 G/DL (ref 13.7–17.5)
HGB UR QL STRIP.AUTO: ABNORMAL
HYALINE CASTS UR QL AUTO: ABNORMAL /LPF
KETONES UR QL STRIP: NEGATIVE
LEUKOCYTE ESTERASE UR QL STRIP.AUTO: NEGATIVE
MCH RBC QN AUTO: 31.2 PG (ref 26–34)
MCHC RBC AUTO-ENTMCNC: 34.8 G/DL (ref 31–37)
MCV RBC AUTO: 89.7 FL (ref 80–100)
MUCOUS THREADS URNS QL MICRO: ABNORMAL /HPF
NITRITE UR QL STRIP: NEGATIVE
PH UR STRIP.AUTO: 6.5 [PH] (ref 5–8)
PLATELET # BLD AUTO: 225 10*3/MM3 (ref 150–450)
PMV BLD AUTO: 10 FL (ref 6–12)
POTASSIUM SERPL-SCNC: 4.2 MMOL/L (ref 3.5–5.2)
PROT UR QL STRIP: NEGATIVE
RBC # BLD AUTO: 4.94 10*6/MM3 (ref 4.63–6.08)
RBC # UR: ABNORMAL /HPF
REF LAB TEST METHOD: ABNORMAL
SODIUM SERPL-SCNC: 142 MMOL/L (ref 136–145)
SP GR UR STRIP: 1.02 (ref 1–1.03)
SQUAMOUS #/AREA URNS HPF: ABNORMAL /HPF
UROBILINOGEN UR QL STRIP: ABNORMAL
WBC NRBC COR # BLD: 10.4 10*3/MM3 (ref 5–10)
WBC UR QL AUTO: ABNORMAL /HPF

## 2017-12-08 PROCEDURE — 85027 COMPLETE CBC AUTOMATED: CPT | Performed by: NURSE PRACTITIONER

## 2017-12-08 PROCEDURE — 93010 ELECTROCARDIOGRAM REPORT: CPT | Performed by: INTERNAL MEDICINE

## 2017-12-08 PROCEDURE — 93005 ELECTROCARDIOGRAM TRACING: CPT | Performed by: PLASTIC SURGERY

## 2017-12-08 PROCEDURE — 81001 URINALYSIS AUTO W/SCOPE: CPT | Performed by: NURSE PRACTITIONER

## 2017-12-08 PROCEDURE — 74176 CT ABD & PELVIS W/O CONTRAST: CPT

## 2017-12-08 PROCEDURE — 99214 OFFICE O/P EST MOD 30 MIN: CPT | Performed by: NURSE PRACTITIONER

## 2017-12-08 NOTE — NURSING NOTE
Spoke with Dr. Obrien, ok'd for patient to be discharged home, informed patient that Alessandra BASS would be calling him this afternoon. Voiced understanding, home per self, no distress.

## 2017-12-08 NOTE — PROGRESS NOTES
Subjective   Greg Adkins is a 37 y.o. male.     HPI Comments: He has been seeing Dr Mcmillan for carpal tunnel of bilateral hands. He is due to have surgery on 12/21/2017.  He reports having left lower abdominal pain about 2-3 days ago. He did lift about 400 lbs of wire with assistance on Tuesday. He started with symptoms that night.     Neck Pain    This is a new problem. Associated symptoms include numbness (getting better ). Pertinent negatives include no chest pain or fever.   Abdominal Pain   This is a new problem. The current episode started in the past 7 days. The problem occurs intermittently. The problem has been gradually worsening. The pain is located in the LLQ. The pain is at a severity of 1/10. The pain is mild. The quality of the pain is cramping. The abdominal pain does not radiate. Pertinent negatives include no constipation, diarrhea, dysuria, fever, frequency, hematuria, nausea or vomiting. The pain is aggravated by coughing (full bladder ). The pain is relieved by urination. He has tried nothing for the symptoms. hx of kidney stones         The following portions of the patient's history were reviewed and updated as appropriate: allergies, current medications and problem list.    Review of Systems   Constitutional: Negative for activity change, appetite change, fatigue and fever.   Respiratory: Negative for cough, shortness of breath and wheezing.    Cardiovascular: Negative for chest pain, palpitations and leg swelling.   Gastrointestinal: Positive for abdominal pain. Negative for constipation, diarrhea, nausea and vomiting.   Genitourinary: Negative for dysuria, flank pain, frequency and hematuria.        No change in urinary stream    Musculoskeletal: Positive for neck pain. Negative for back pain.   Neurological: Positive for numbness (getting better ).       Objective   Physical Exam   Constitutional: He is oriented to person, place, and time. He appears well-developed and well-nourished.    HENT:   Head: Normocephalic.   Nose: Nose normal.   Cardiovascular: Regular rhythm and normal heart sounds.  Exam reveals no S3 and no S4.    No murmur heard.  Pulmonary/Chest: Effort normal and breath sounds normal. He has no decreased breath sounds. He has no wheezes. He has no rhonchi. He has no rales.   Abdominal: Bowel sounds are normal. There is no hepatosplenomegaly. There is tenderness in the left lower quadrant. No hernia.   Musculoskeletal: He exhibits no edema.   Neurological: He is alert and oriented to person, place, and time. Gait normal.   Skin: Skin is warm and dry.   Psychiatric: He has a normal mood and affect.       Assessment/Plan   Greg was seen today for neck pain, numbness and abdominal pain.    Diagnoses and all orders for this visit:    Numbness in both hands    Bilateral carpal tunnel syndrome  Comments:  due for surgery of left wrist on 12/21/2017 with Dr Mcmillan     Left lower quadrant pain  Comments:  No lifting/pushing or pulling   Orders:  -     Urinalysis With / Microscopic If Indicated - Urine, Clean Catch; Future  -     Urinalysis With / Microscopic If Indicated - Urine, Clean Catch  -     Urinalysis, Microscopic Only - Urine, Clean Catch; Future  -     Urinalysis, Microscopic Only - Urine, Clean Catch  -     CBC (No Diff); Future  -     Basic Metabolic Panel; Future  -     CT Abdomen Pelvis Stone Protocol; Future  -     CBC (No Diff)  -     Basic Metabolic Panel    Urine with trace blood. Will send for imaging secondary to history of kidney stones. Will check labs too.

## 2020-01-10 ENCOUNTER — OFFICE VISIT (OUTPATIENT)
Dept: INTERNAL MEDICINE | Facility: CLINIC | Age: 40
End: 2020-01-10

## 2020-01-10 VITALS
WEIGHT: 298.8 LBS | HEART RATE: 94 BPM | OXYGEN SATURATION: 98 % | BODY MASS INDEX: 45.28 KG/M2 | SYSTOLIC BLOOD PRESSURE: 134 MMHG | DIASTOLIC BLOOD PRESSURE: 86 MMHG | HEIGHT: 68 IN

## 2020-01-10 DIAGNOSIS — Z00.00 ANNUAL PHYSICAL EXAM: Primary | ICD-10-CM

## 2020-01-10 DIAGNOSIS — B35.1 ONYCHOMYCOSIS: ICD-10-CM

## 2020-01-10 DIAGNOSIS — F98.8 ATTENTION DEFICIT DISORDER (ADD) WITHOUT HYPERACTIVITY: ICD-10-CM

## 2020-01-10 PROCEDURE — 99396 PREV VISIT EST AGE 40-64: CPT | Performed by: NURSE PRACTITIONER

## 2020-01-10 PROCEDURE — 93000 ELECTROCARDIOGRAM COMPLETE: CPT | Performed by: NURSE PRACTITIONER

## 2020-01-10 RX ORDER — ATOMOXETINE 40 MG/1
40 CAPSULE ORAL DAILY
Qty: 30 CAPSULE | Refills: 1 | Status: SHIPPED | OUTPATIENT
Start: 2020-01-10 | End: 2020-02-07 | Stop reason: SDUPTHER

## 2020-01-10 NOTE — PATIENT INSTRUCTIONS
Health Maintenance, Male  A healthy lifestyle and preventive care is important for your health and wellness. Ask your health care provider about what schedule of regular examinations is right for you.  What should I know about weight and diet?  Eat a Healthy Diet  · Eat plenty of vegetables, fruits, whole grains, low-fat dairy products, and lean protein.  · Do not eat a lot of foods high in solid fats, added sugars, or salt.    Maintain a Healthy Weight  Regular exercise can help you achieve or maintain a healthy weight. You should:  · Do at least 150 minutes of exercise each week. The exercise should increase your heart rate and make you sweat (moderate-intensity exercise).  · Do strength-training exercises at least twice a week.  Watch Your Levels of Cholesterol and Blood Lipids  · Have your blood tested for lipids and cholesterol every 5 years starting at 35 years of age. If you are at high risk for heart disease, you should start having your blood tested when you are 20 years old. You may need to have your cholesterol levels checked more often if:  ? Your lipid or cholesterol levels are high.  ? You are older than 50 years of age.  ? You are at high risk for heart disease.  What should I know about cancer screening?  Many types of cancers can be detected early and may often be prevented.  Lung Cancer  · You should be screened every year for lung cancer if:  ? You are a current smoker who has smoked for at least 30 years.  ? You are a former smoker who has quit within the past 15 years.  · Talk to your health care provider about your screening options, when you should start screening, and how often you should be screened.  Colorectal Cancer  · Routine colorectal cancer screening usually begins at 50 years of age and should be repeated every 5-10 years until you are 75 years old. You may need to be screened more often if early forms of precancerous polyps or small growths are found. Your health care provider may  recommend screening at an earlier age if you have risk factors for colon cancer.  · Your health care provider may recommend using home test kits to check for hidden blood in the stool.  · A small camera at the end of a tube can be used to examine your colon (sigmoidoscopy or colonoscopy). This checks for the earliest forms of colorectal cancer.  Prostate and Testicular Cancer  · Depending on your age and overall health, your health care provider may do certain tests to screen for prostate and testicular cancer.  · Talk to your health care provider about any symptoms or concerns you have about testicular or prostate cancer.  Skin Cancer  · Check your skin from head to toe regularly.  · Tell your health care provider about any new moles or changes in moles, especially if:  ? There is a change in a mole’s size, shape, or color.  ? You have a mole that is larger than a pencil eraser.  · Always use sunscreen. Apply sunscreen liberally and repeat throughout the day.  · Protect yourself by wearing long sleeves, pants, a wide-brimmed hat, and sunglasses when outside.  What should I know about heart disease, diabetes, and high blood pressure?  · If you are 18-39 years of age, have your blood pressure checked every 3-5 years. If you are 40 years of age or older, have your blood pressure checked every year. You should have your blood pressure measured twice--once when you are at a hospital or clinic, and once when you are not at a hospital or clinic. Record the average of the two measurements. To check your blood pressure when you are not at a hospital or clinic, you can use:  ? An automated blood pressure machine at a pharmacy.  ? A home blood pressure monitor.  · Talk to your health care provider about your target blood pressure.  · If you are between 45-79 years old, ask your health care provider if you should take aspirin to prevent heart disease.  · Have regular diabetes screenings by checking your fasting blood sugar  level.  ? If you are at a normal weight and have a low risk for diabetes, have this test once every three years after the age of 45.  ? If you are overweight and have a high risk for diabetes, consider being tested at a younger age or more often.  · A one-time screening for abdominal aortic aneurysm (AAA) by ultrasound is recommended for men aged 65-75 years who are current or former smokers.  What should I know about preventing infection?  Hepatitis B  If you have a higher risk for hepatitis B, you should be screened for this virus. Talk with your health care provider to find out if you are at risk for hepatitis B infection.  Hepatitis C  Blood testing is recommended for:  · Everyone born from 1945 through 1965.  · Anyone with known risk factors for hepatitis C.  Sexually Transmitted Diseases (STDs)  · You should be screened each year for STDs including gonorrhea and chlamydia if:  ? You are sexually active and are younger than 24 years of age.  ? You are older than 24 years of age and your health care provider tells you that you are at risk for this type of infection.  ? Your sexual activity has changed since you were last screened and you are at an increased risk for chlamydia or gonorrhea. Ask your health care provider if you are at risk.  · Talk with your health care provider about whether you are at high risk of being infected with HIV. Your health care provider may recommend a prescription medicine to help prevent HIV infection.  What else can I do?  · Schedule regular health, dental, and eye exams.  · Stay current with your vaccines (immunizations).  · Do not use any tobacco products, such as cigarettes, chewing tobacco, and e-cigarettes. If you need help quitting, ask your health care provider.  · Limit alcohol intake to no more than 2 drinks per day. One drink equals 12 ounces of beer, 5 ounces of wine, or 1½ ounces of hard liquor.  · Do not use street drugs.  · Do not share needles.  · Ask your health  care provider for help if you need support or information about quitting drugs.  · Tell your health care provider if you often feel depressed.  · Tell your health care provider if you have ever been abused or do not feel safe at home.  This information is not intended to replace advice given to you by your health care provider. Make sure you discuss any questions you have with your health care provider.  Document Released: 06/15/2009 Document Revised: 08/16/2017 Document Reviewed: 09/20/2016  [employer] Interactive Patient Education © 2019 [employer]      Fungal Nail Infection  A fungal nail infection is a common infection of the toenails or fingernails. This condition affects toenails more often than fingernails. It often affects the great, or big, toes. More than one nail may be infected. The condition can be passed from person to person (is contagious).  What are the causes?  This condition is caused by a fungus. Several types of fungi can cause the infection. These fungi are common in moist and warm areas. If your hands or feet come into contact with the fungus, it may get into a crack in your fingernail or toenail and cause the infection.  What increases the risk?  The following factors may make you more likely to develop this condition:  · Being male.  · Being of older age.  · Living with someone who has the fungus.  · Walking barefoot in areas where the fungus thrives, such as showers or locker rooms.  · Wearing shoes and socks that cause your feet to sweat.  · Having a nail injury or a recent nail surgery.  · Having certain medical conditions, such as:  ? Athlete's foot.  ? Diabetes.  ? Psoriasis.  ? Poor circulation.  ? A weak body defense system (immune system).  What are the signs or symptoms?  Symptoms of this condition include:  · A pale spot on the nail.  · Thickening of the nail.  · A nail that becomes yellow or brown.  · A brittle or ragged nail edge.  · A crumbling nail.  · A nail that has lifted  away from the nail bed.  How is this diagnosed?  This condition is diagnosed with a physical exam. Your health care provider may take a scraping or clipping from your nail to test for the fungus.  How is this treated?  Treatment is not needed for mild infections. If you have significant nail changes, treatment may include:  · Antifungal medicines taken by mouth (orally). You may need to take the medicine for several weeks or several months, and you may not see the results for a long time. These medicines can cause side effects. Ask your health care provider what problems to watch for.  · Antifungal nail polish or nail cream. These may be used along with oral antifungal medicines.  · Laser treatment of the nail.  · Surgery to remove the nail. This may be needed for the most severe infections.  It can take a long time, usually up to a year, for the infection to go away. The infection may also come back.  Follow these instructions at home:  Medicines  · Take or apply over-the-counter and prescription medicines only as told by your health care provider.  · Ask your health care provider about using over-the-counter mentholated ointment on your nails.  Nail care  · Trim your nails often.  · Wash and dry your hands and feet every day.  · Keep your feet dry:  ? Wear absorbent socks, and change your socks frequently.  ? Wear shoes that allow air to circulate, such as sandals or canvas tennis shoes. Throw out old shoes.  · Do not use artificial nails.  · If you go to a nail salon, make sure you choose one that uses clean instruments.  · Use antifungal foot powder on your feet and in your shoes.  General instructions  · Do not share personal items, such as towels or nail clippers.  · Do not walk barefoot in shower rooms or locker rooms.  · Wear rubber gloves if you are working with your hands in wet areas.  · Keep all follow-up visits as told by your health care provider. This is important.  Contact a health care provider  if:  Your infection is not getting better or it is getting worse after several months.  Summary  · A fungal nail infection is a common infection of the toenails or fingernails.  · Treatment is not needed for mild infections. If you have significant nail changes, treatment may include taking medicine orally and applying medicine to your nails.  · It can take a long time, usually up to a year, for the infection to go away. The infection may also come back.  · Take or apply over-the-counter and prescription medicines only as told by your health care provider.  · Follow instructions for taking care of your nails to help prevent infection from coming back or spreading.  This information is not intended to replace advice given to you by your health care provider. Make sure you discuss any questions you have with your health care provider.  Document Released: 12/15/2001 Document Revised: 05/24/2019 Document Reviewed: 05/24/2019  [employer] Interactive Patient Education © 2019 [employer]

## 2020-01-10 NOTE — PROGRESS NOTES
Subjective   Greg Adkins is a 39 y.o. male.     .Well Adult Physical   Patient here for a comprehensive physical exam.The patient reports problems - concentration     Do you take any herbs or supplements that were not prescribed by a doctor? no   Are you taking calcium supplements? no   Are you taking aspirin daily? no     History:  Patient receives prostate care outside our clinic  Date last prostate exam: n/a  Date last PSA: n/a       He remains working full time. Overall doing well. He is busy with construction. He is up to date with vision and dental exam.     He has history dyslexia. Also history of attention deficit disorder. In childhood he was on ritalin and at some point strattera. He reports in last couple year his ability to focus has worsened. He has a lot of unfinished task and would like to restart strattera if possible.       He also makes mention of toenail fungus on bilateral toenails. This has been going on for 6 years. He has been doing salt water soaks intermittently with little help.         The following portions of the patient's history were reviewed and updated as appropriate: allergies, current medications, past family history, past medical history, past social history, past surgical history and problem list.    Review of Systems   Constitutional: Positive for fatigue. Negative for activity change, appetite change, chills, fever and unexpected weight change.        Overall doing well   HENT: Negative for congestion, ear discharge, ear pain, hearing loss, mouth sores, nosebleeds, postnasal drip, rhinorrhea, sinus pressure, sneezing, sore throat, tinnitus and voice change.    Eyes: Negative for visual disturbance.   Respiratory: Negative for cough, chest tightness, shortness of breath and wheezing.    Cardiovascular: Negative for chest pain, palpitations and leg swelling.   Gastrointestinal: Negative for abdominal distention, abdominal pain, anal bleeding, blood in stool, constipation,  diarrhea, nausea and vomiting.   Endocrine: Negative for cold intolerance, heat intolerance, polydipsia, polyphagia and polyuria.   Genitourinary: Negative for difficulty urinating, discharge, frequency, hematuria, penile pain, penile swelling, scrotal swelling, testicular pain and urgency.   Musculoskeletal: Negative for arthralgias, back pain, gait problem, joint swelling, myalgias, neck pain and neck stiffness.   Skin: Negative for color change, pallor and rash.        NEGATIVE BREAST MASS, BREAST PAIN, NIPPLE DISCHARGE, SKIN CHANGES   Neurological: Negative for dizziness, tremors, seizures, syncope, speech difficulty, weakness, light-headedness, numbness and headaches.   Hematological: Negative for adenopathy. Does not bruise/bleed easily.   Psychiatric/Behavioral: Positive for decreased concentration. Negative for confusion, dysphoric mood, sleep disturbance and suicidal ideas. The patient is not nervous/anxious.        Objective   Physical Exam   Constitutional: He is oriented to person, place, and time. He appears well-developed and well-nourished. No distress.   HENT:   Head: Normocephalic and atraumatic.   Right Ear: External ear normal.   Left Ear: External ear normal.   Nose: Nose normal.   Mouth/Throat: Oropharynx is clear and moist.   Eyes: Pupils are equal, round, and reactive to light. Conjunctivae and EOM are normal. Right eye exhibits no discharge. Left eye exhibits no discharge. No scleral icterus.   Neck: Normal range of motion. Neck supple. No JVD present. No tracheal deviation present. No thyromegaly present.   Cardiovascular: Normal rate, regular rhythm, normal heart sounds and intact distal pulses. Exam reveals no gallop and no friction rub.   No murmur heard.  Pulmonary/Chest: Effort normal and breath sounds normal. No respiratory distress. He has no wheezes. He has no rales. He exhibits no tenderness.   Abdominal: Soft. Bowel sounds are normal. He exhibits no distension and no mass. There is  no tenderness. There is no rebound and no guarding. No hernia.   Genitourinary: Rectum normal, prostate normal and penis normal. Rectal exam shows guaiac negative stool.   Musculoskeletal: Normal range of motion. He exhibits no edema.   Lymphadenopathy:     He has no cervical adenopathy.   Neurological: He is alert and oriented to person, place, and time. He has normal reflexes. No cranial nerve deficit. He exhibits normal muscle tone. Coordination normal.   Skin: Skin is warm and dry. No rash noted. No erythema.   Psychiatric: He has a normal mood and affect. His behavior is normal. Judgment and thought content normal.   Vitals reviewed.      Assessment/Plan   Greg was seen today for annual exam.    Diagnoses and all orders for this visit:    Annual physical exam  -     Comprehensive Metabolic Panel; Future  -     Lipid Panel; Future  -     TSH Rfx On Abnormal To Free T4; Future  -     CBC No Differential; Future  -     ECG 12 Lead    Attention deficit disorder (ADD) without hyperactivity  Comments:  will try strattera   Orders:  -     atomoxetine (STRATTERA) 40 MG capsule; Take 1 capsule by mouth Daily.  -     Vitamin B12; Future    Onychomycosis  Comments:  discussed keeping toes dry, may use otc lamisil, wool socks      ECG 12 Lead  Date/Time: 1/10/2020 11:56 AM  Performed by: Alessandra Spears APRN  Authorized by: Alessandra Spears APRN   Comparison: compared with previous ECG from 12/8/2017  Similar to previous ECG  Rhythm: sinus rhythm  Rate: normal  Conduction: conduction normal  ST Segments: ST segments normal  T Waves: T waves normal  QRS axis: normal    Clinical impression: normal ECG          He will return for lab work and then decision will be made on medication for toenail fungus.  He was advised to cardio exercise 30-45 minutes 3-4 times week along with weight loss (decreased portion control and increased fruits/vegatable and leaner meats).

## 2020-01-14 ENCOUNTER — LAB (OUTPATIENT)
Dept: INTERNAL MEDICINE | Facility: CLINIC | Age: 40
End: 2020-01-14

## 2020-01-14 DIAGNOSIS — Z00.00 ANNUAL PHYSICAL EXAM: ICD-10-CM

## 2020-01-14 DIAGNOSIS — F98.8 ATTENTION DEFICIT DISORDER (ADD) WITHOUT HYPERACTIVITY: ICD-10-CM

## 2020-01-14 LAB
CHOLEST SERPL-MCNC: 181 MG/DL (ref 0–200)
ERYTHROCYTE [DISTWIDTH] IN BLOOD BY AUTOMATED COUNT: 13 % (ref 12.3–15.4)
HCT VFR BLD AUTO: 41.7 % (ref 37.5–51)
HDLC SERPL-MCNC: 45 MG/DL (ref 40–60)
HGB BLD-MCNC: 14.6 G/DL (ref 13–17.7)
LDLC SERPL CALC-MCNC: 117 MG/DL (ref 0–100)
MCH RBC QN AUTO: 30.7 PG (ref 26.6–33)
MCHC RBC AUTO-ENTMCNC: 35 G/DL (ref 31.5–35.7)
MCV RBC AUTO: 87.6 FL (ref 79–97)
PLATELET # BLD AUTO: 238 10*3/MM3 (ref 140–450)
RBC # BLD AUTO: 4.76 10*6/MM3 (ref 4.14–5.8)
TRIGL SERPL-MCNC: 93 MG/DL (ref 0–150)
VLDLC SERPL-MCNC: 18.6 MG/DL
WBC # BLD AUTO: 8.75 10*3/MM3 (ref 3.4–10.8)

## 2020-01-15 ENCOUNTER — TELEPHONE (OUTPATIENT)
Dept: INTERNAL MEDICINE | Facility: CLINIC | Age: 40
End: 2020-01-15

## 2020-01-15 DIAGNOSIS — E03.9 ACQUIRED HYPOTHYROIDISM: Primary | ICD-10-CM

## 2020-01-15 LAB
T4 FREE SERPL-MCNC: 0.78 NG/DL (ref 0.93–1.7)
TSH SERPL-ACNC: 10.6 UIU/ML (ref 0.27–4.2)
VIT B12 SERPL-MCNC: 569 PG/ML (ref 211–946)

## 2020-01-15 RX ORDER — LEVOTHYROXINE SODIUM 0.03 MG/1
25 TABLET ORAL DAILY
Qty: 30 TABLET | Refills: 2 | Status: SHIPPED | OUTPATIENT
Start: 2020-01-15 | End: 2020-03-20 | Stop reason: SDUPTHER

## 2020-01-15 NOTE — TELEPHONE ENCOUNTER
I called patient to discuss his TSH levels. There was no answer so I left a message for him to return call. I did send message via labs.

## 2020-01-15 NOTE — TELEPHONE ENCOUNTER
Detailed message left on v/m of providers notes. He will need to call back to schedule a lab appointment for 6-8 weeks.

## 2020-01-15 NOTE — TELEPHONE ENCOUNTER
Mr. Adkins returned your call and I read him your notes on his recent labs.    He is agreeable to starting synthroid (levothyroxine) per your note. I've updated his new pharmacy in the chart to [facility].    Thank you,    Von

## 2020-01-15 NOTE — TELEPHONE ENCOUNTER
I have sent over levothyroxine 25 mcg daily. Please inform patient to take medication in the morning on empty stomach one hour prior to any other medication and/or food. He will need to return in 6-8 weeks for recheck of labs (non fasting). Please arrange. Thanks

## 2020-02-07 ENCOUNTER — OFFICE VISIT (OUTPATIENT)
Dept: INTERNAL MEDICINE | Facility: CLINIC | Age: 40
End: 2020-02-07

## 2020-02-07 VITALS
OXYGEN SATURATION: 99 % | WEIGHT: 289 LBS | BODY MASS INDEX: 43.8 KG/M2 | SYSTOLIC BLOOD PRESSURE: 128 MMHG | DIASTOLIC BLOOD PRESSURE: 86 MMHG | HEART RATE: 79 BPM | HEIGHT: 68 IN

## 2020-02-07 DIAGNOSIS — F98.8 ATTENTION DEFICIT DISORDER (ADD) WITHOUT HYPERACTIVITY: ICD-10-CM

## 2020-02-07 DIAGNOSIS — E03.9 ACQUIRED HYPOTHYROIDISM: Primary | ICD-10-CM

## 2020-02-07 LAB
ALBUMIN SERPL-MCNC: 4.5 G/DL (ref 3.5–5.2)
ALBUMIN/GLOB SERPL: 2 G/DL
ALP SERPL-CCNC: 61 U/L (ref 39–117)
ALT SERPL-CCNC: 31 U/L (ref 1–41)
AST SERPL-CCNC: 27 U/L (ref 1–40)
BILIRUB SERPL-MCNC: 0.7 MG/DL (ref 0.2–1.2)
BUN SERPL-MCNC: 13 MG/DL (ref 6–20)
BUN/CREAT SERPL: 14.4 (ref 7–25)
CALCIUM SERPL-MCNC: 9.4 MG/DL (ref 8.6–10.5)
CHLORIDE SERPL-SCNC: 104 MMOL/L (ref 98–107)
CO2 SERPL-SCNC: 24.7 MMOL/L (ref 22–29)
CREAT SERPL-MCNC: 0.9 MG/DL (ref 0.76–1.27)
GLOBULIN SER CALC-MCNC: 2.3 GM/DL
GLUCOSE SERPL-MCNC: 99 MG/DL (ref 65–99)
POTASSIUM SERPL-SCNC: 3.9 MMOL/L (ref 3.5–5.2)
PROT SERPL-MCNC: 6.8 G/DL (ref 6–8.5)
SODIUM SERPL-SCNC: 142 MMOL/L (ref 136–145)

## 2020-02-07 PROCEDURE — 99213 OFFICE O/P EST LOW 20 MIN: CPT | Performed by: NURSE PRACTITIONER

## 2020-02-07 RX ORDER — ATOMOXETINE 40 MG/1
40 CAPSULE ORAL DAILY
Qty: 30 CAPSULE | Refills: 4 | Status: SHIPPED | OUTPATIENT
Start: 2020-02-07 | End: 2020-04-07 | Stop reason: SDUPTHER

## 2020-02-07 NOTE — PATIENT INSTRUCTIONS
Hypothyroidism    Hypothyroidism is when the thyroid gland does not make enough of certain hormones (it is underactive). The thyroid gland is a small gland located in the lower front part of the neck, just in front of the windpipe (trachea). This gland makes hormones that help control how the body uses food for energy (metabolism) as well as how the heart and brain function. These hormones also play a role in keeping your bones strong. When the thyroid is underactive, it produces too little of the hormones thyroxine (T4) and triiodothyronine (T3).  What are the causes?  This condition may be caused by:  · Hashimoto's disease. This is a disease in which the body's disease-fighting system (immune system) attacks the thyroid gland. This is the most common cause.  · Viral infections.  · Pregnancy.  · Certain medicines.  · Birth defects.  · Past radiation treatments to the head or neck for cancer.  · Past treatment with radioactive iodine.  · Past exposure to radiation in the environment.  · Past surgical removal of part or all of the thyroid.  · Problems with a gland in the center of the brain (pituitary gland).  · Lack of enough iodine in the diet.  What increases the risk?  You are more likely to develop this condition if:  · You are female.  · You have a family history of thyroid conditions.  · You use a medicine called lithium.  · You take medicines that affect the immune system (immunosuppressants).  What are the signs or symptoms?  Symptoms of this condition include:  · Feeling as though you have no energy (lethargy).  · Not being able to tolerate cold.  · Weight gain that is not explained by a change in diet or exercise habits.  · Lack of appetite.  · Dry skin.  · Coarse hair.  · Menstrual irregularity.  · Slowing of thought processes.  · Constipation.  · Sadness or depression.  How is this diagnosed?  This condition may be diagnosed based on:  · Your symptoms, your medical history, and a physical exam.  · Blood  tests.  You may also have imaging tests, such as an ultrasound or MRI.  How is this treated?  This condition is treated with medicine that replaces the thyroid hormones that your body does not make. After you begin treatment, it may take several weeks for symptoms to go away.  Follow these instructions at home:  · Take over-the-counter and prescription medicines only as told by your health care provider.  · If you start taking any new medicines, tell your health care provider.  · Keep all follow-up visits as told by your health care provider. This is important.  ? As your condition improves, your dosage of thyroid hormone medicine may change.  ? You will need to have blood tests regularly so that your health care provider can monitor your condition.  Contact a health care provider if:  · Your symptoms do not get better with treatment.  · You are taking thyroid replacement medicine and you:  ? Sweat a lot.  ? Have tremors.  ? Feel anxious.  ? Lose weight rapidly.  ? Cannot tolerate heat.  ? Have emotional swings.  ? Have diarrhea.  ? Feel weak.  Get help right away if you have:  · Chest pain.  · An irregular heartbeat.  · A rapid heartbeat.  · Difficulty breathing.  Summary  · Hypothyroidism is when the thyroid gland does not make enough of certain hormones (it is underactive).  · When the thyroid is underactive, it produces too little of the hormones thyroxine (T4) and triiodothyronine (T3).  · The most common cause is Hashimoto's disease, a disease in which the body's disease-fighting system (immune system) attacks the thyroid gland. The condition can also be caused by viral infections, medicine, pregnancy, or past radiation treatment to the head or neck.  · Symptoms may include weight gain, dry skin, constipation, feeling as though you do not have energy, and not being able to tolerate cold.  · This condition is treated with medicine to replace the thyroid hormones that your body does not make.  This information  is not intended to replace advice given to you by your health care provider. Make sure you discuss any questions you have with your health care provider.  Document Released: 12/18/2006 Document Revised: 11/28/2018 Document Reviewed: 11/28/2018  [employer] Patient Education © 2019 [employer]

## 2020-02-07 NOTE — PROGRESS NOTES
Subjective   Greg Adkins is a 40 y.o. male.     He is here for follow up for ADD. He was started on Strattera and has noticed significant improvement with concentration. He denies any side effects.     He was also noted to have elevated TSH at last visit, however he did not start levothyroxine. He wanted to discuss today the medication and diagnosis in further details.     Hypothyroidism   This is a new problem. The current episode started more than 1 month ago. Associated symptoms include fatigue. Pertinent negatives include no chest pain, coughing, fever, headaches, swollen glands, urinary symptoms or weakness. He has tried nothing for the symptoms.        The following portions of the patient's history were reviewed and updated as appropriate: allergies, current medications, past family history, past medical history, past social history, past surgical history and problem list.    Review of Systems   Constitutional: Positive for fatigue. Negative for activity change, appetite change and fever.        Intentional weight loss    Respiratory: Negative for cough, shortness of breath and wheezing.    Cardiovascular: Negative for chest pain, palpitations and leg swelling.   Neurological: Positive for dizziness (once, resolved ). Negative for tremors, speech difficulty, weakness and headaches.   Psychiatric/Behavioral: Positive for decreased concentration. Negative for dysphoric mood, sleep disturbance and suicidal ideas. The patient is not nervous/anxious.        Objective   Physical Exam   Constitutional: He is oriented to person, place, and time. He appears well-developed and well-nourished.   HENT:   Head: Normocephalic.   Nose: Nose normal.   Cardiovascular: Regular rhythm and normal heart sounds. Exam reveals no S3 and no S4.   No murmur heard.  No pedaled edema    Pulmonary/Chest: Effort normal and breath sounds normal. He has no decreased breath sounds. He has no wheezes. He has no rhonchi. He has no rales.    Musculoskeletal: He exhibits no edema.   Neurological: He is alert and oriented to person, place, and time. Gait normal.   Skin: Skin is warm and dry.   Psychiatric: He has a normal mood and affect. His speech is normal and behavior is normal. Judgment and thought content normal. Cognition and memory are normal.       Assessment/Plan   Greg was seen today for hypothyroidism.    Diagnoses and all orders for this visit:    Acquired hypothyroidism  Comments:  will recheck levels today and start levothyroxine if remains elevated,   Orders:  -     T3, Free  -     T4, Free  -     TSH  -     Comprehensive Metabolic Panel    Attention deficit disorder (ADD) without hyperactivity  Comments:  doing well with straterra   Orders:  -     atomoxetine (STRATTERA) 40 MG capsule; Take 1 capsule by mouth Daily.    I reviewed recent lab work with patient.

## 2020-02-08 LAB
T3FREE SERPL-MCNC: 3.4 PG/ML (ref 2–4.4)
T4 FREE SERPL-MCNC: 0.82 NG/DL (ref 0.93–1.7)
TSH SERPL DL<=0.005 MIU/L-ACNC: 7.5 UIU/ML (ref 0.27–4.2)

## 2020-03-20 DIAGNOSIS — E03.9 ACQUIRED HYPOTHYROIDISM: ICD-10-CM

## 2020-03-20 RX ORDER — LEVOTHYROXINE SODIUM 0.03 MG/1
25 TABLET ORAL DAILY
Qty: 90 TABLET | Refills: 0 | Status: SHIPPED | OUTPATIENT
Start: 2020-03-20 | End: 2020-07-13

## 2020-04-07 DIAGNOSIS — F98.8 ATTENTION DEFICIT DISORDER (ADD) WITHOUT HYPERACTIVITY: ICD-10-CM

## 2020-04-07 RX ORDER — ATOMOXETINE 40 MG/1
40 CAPSULE ORAL DAILY
Qty: 90 CAPSULE | Refills: 0 | Status: SHIPPED | OUTPATIENT
Start: 2020-04-07 | End: 2020-07-13

## 2020-07-12 DIAGNOSIS — E03.9 ACQUIRED HYPOTHYROIDISM: ICD-10-CM

## 2020-07-12 DIAGNOSIS — F98.8 ATTENTION DEFICIT DISORDER (ADD) WITHOUT HYPERACTIVITY: ICD-10-CM

## 2020-07-13 RX ORDER — LEVOTHYROXINE SODIUM 0.03 MG/1
TABLET ORAL
Qty: 30 TABLET | Refills: 0 | Status: SHIPPED | OUTPATIENT
Start: 2020-07-13 | End: 2020-08-12

## 2020-07-13 RX ORDER — ATOMOXETINE 40 MG/1
CAPSULE ORAL
Qty: 30 CAPSULE | Refills: 0 | Status: SHIPPED | OUTPATIENT
Start: 2020-07-13 | End: 2021-01-29

## 2020-08-11 DIAGNOSIS — E03.9 ACQUIRED HYPOTHYROIDISM: ICD-10-CM

## 2020-08-12 RX ORDER — LEVOTHYROXINE SODIUM 0.03 MG/1
TABLET ORAL
Qty: 30 TABLET | Refills: 0 | Status: SHIPPED | OUTPATIENT
Start: 2020-08-12 | End: 2020-09-18

## 2020-09-17 DIAGNOSIS — E03.9 ACQUIRED HYPOTHYROIDISM: ICD-10-CM

## 2020-09-18 RX ORDER — LEVOTHYROXINE SODIUM 0.03 MG/1
TABLET ORAL
Qty: 30 TABLET | Refills: 0 | Status: SHIPPED | OUTPATIENT
Start: 2020-09-18 | End: 2020-10-19

## 2020-10-19 DIAGNOSIS — E03.9 ACQUIRED HYPOTHYROIDISM: ICD-10-CM

## 2020-10-19 RX ORDER — LEVOTHYROXINE SODIUM 0.03 MG/1
TABLET ORAL
Qty: 30 TABLET | Refills: 1 | Status: SHIPPED | OUTPATIENT
Start: 2020-10-19 | End: 2021-02-01

## 2021-01-29 ENCOUNTER — OFFICE VISIT (OUTPATIENT)
Dept: INTERNAL MEDICINE | Facility: CLINIC | Age: 41
End: 2021-01-29

## 2021-01-29 VITALS
TEMPERATURE: 98 F | SYSTOLIC BLOOD PRESSURE: 138 MMHG | OXYGEN SATURATION: 98 % | HEART RATE: 76 BPM | BODY MASS INDEX: 43.35 KG/M2 | WEIGHT: 286 LBS | DIASTOLIC BLOOD PRESSURE: 84 MMHG | HEIGHT: 68 IN

## 2021-01-29 DIAGNOSIS — H65.01 NON-RECURRENT ACUTE SEROUS OTITIS MEDIA OF RIGHT EAR: ICD-10-CM

## 2021-01-29 DIAGNOSIS — F98.8 ATTENTION DEFICIT DISORDER (ADD) WITHOUT HYPERACTIVITY: ICD-10-CM

## 2021-01-29 DIAGNOSIS — E03.9 ACQUIRED HYPOTHYROIDISM: Primary | ICD-10-CM

## 2021-01-29 DIAGNOSIS — Z23 NEED FOR INFLUENZA VACCINATION: ICD-10-CM

## 2021-01-29 LAB
ALBUMIN SERPL-MCNC: 4.8 G/DL (ref 3.5–5.2)
ALBUMIN/GLOB SERPL: 2.3 G/DL
ALP SERPL-CCNC: 74 U/L (ref 39–117)
ALT SERPL-CCNC: 29 U/L (ref 1–41)
AST SERPL-CCNC: 25 U/L (ref 1–40)
BILIRUB SERPL-MCNC: 0.6 MG/DL (ref 0–1.2)
BUN SERPL-MCNC: 16 MG/DL (ref 6–20)
BUN/CREAT SERPL: 14 (ref 7–25)
CALCIUM SERPL-MCNC: 9.9 MG/DL (ref 8.6–10.5)
CHLORIDE SERPL-SCNC: 102 MMOL/L (ref 98–107)
CHOLEST SERPL-MCNC: 204 MG/DL (ref 0–200)
CO2 SERPL-SCNC: 28.8 MMOL/L (ref 22–29)
CREAT SERPL-MCNC: 1.14 MG/DL (ref 0.76–1.27)
GLOBULIN SER CALC-MCNC: 2.1 GM/DL
GLUCOSE SERPL-MCNC: 93 MG/DL (ref 65–99)
HDLC SERPL-MCNC: 48 MG/DL (ref 40–60)
LDLC SERPL CALC-MCNC: 133 MG/DL (ref 0–100)
POTASSIUM SERPL-SCNC: 4.3 MMOL/L (ref 3.5–5.2)
PROT SERPL-MCNC: 6.9 G/DL (ref 6–8.5)
SODIUM SERPL-SCNC: 140 MMOL/L (ref 136–145)
T4 FREE SERPL-MCNC: 0.84 NG/DL (ref 0.93–1.7)
TRIGL SERPL-MCNC: 128 MG/DL (ref 0–150)
TSH SERPL DL<=0.005 MIU/L-ACNC: 8.46 UIU/ML (ref 0.27–4.2)
VLDLC SERPL CALC-MCNC: 23 MG/DL (ref 5–40)

## 2021-01-29 PROCEDURE — 90686 IIV4 VACC NO PRSV 0.5 ML IM: CPT | Performed by: NURSE PRACTITIONER

## 2021-01-29 PROCEDURE — 99213 OFFICE O/P EST LOW 20 MIN: CPT | Performed by: NURSE PRACTITIONER

## 2021-01-29 PROCEDURE — 90471 IMMUNIZATION ADMIN: CPT | Performed by: NURSE PRACTITIONER

## 2021-01-29 RX ORDER — AMOXICILLIN 500 MG/1
500 CAPSULE ORAL 2 TIMES DAILY
Qty: 14 CAPSULE | Refills: 0 | Status: SHIPPED | OUTPATIENT
Start: 2021-01-29 | End: 2021-02-05

## 2021-01-29 RX ORDER — ATOMOXETINE 40 MG/1
40 CAPSULE ORAL 2 TIMES DAILY
Qty: 60 CAPSULE | Refills: 3 | Status: SHIPPED | OUTPATIENT
Start: 2021-01-29 | End: 2022-08-08

## 2021-01-29 NOTE — PROGRESS NOTES
Subjective   Greg Adkins is a 41 y.o. male.     Overall doing ok. He reports that the strattera seems to be wearing off about mid day while he is at work, otherwise tolerating medication.  He does report having some left ear pain that started couple weeks ago. He denies any fever or chills.      Hypothyroidism  This is a new problem. The current episode started more than 1 month ago. Pertinent negatives include no chest pain, coughing, fatigue, fever or headaches. Treatments tried: levothyroxine         The following portions of the patient's history were reviewed and updated as appropriate: allergies, current medications, past family history, past medical history, past social history, past surgical history and problem list.    Review of Systems   Constitutional: Negative for activity change, appetite change, fatigue and fever.   HENT: Positive for ear pain (right ear).    Respiratory: Negative for cough, shortness of breath and wheezing.    Cardiovascular: Negative for chest pain, palpitations and leg swelling.   Neurological: Negative for dizziness and headaches.   Psychiatric/Behavioral: Positive for decreased concentration. Negative for confusion, dysphoric mood, hallucinations, sleep disturbance and suicidal ideas. The patient is not nervous/anxious.        Objective   Physical Exam  Constitutional:       Appearance: He is well-developed.   HENT:      Head: Normocephalic.      Right Ear: Hearing normal.      Left Ear: Hearing normal. No drainage or swelling. Tympanic membrane is erythematous and bulging.      Nose: Nose normal.   Neck:      Musculoskeletal: Full passive range of motion without pain.      Thyroid: No thyroid mass.      Vascular: No carotid bruit.   Cardiovascular:      Rate and Rhythm: Regular rhythm.      Heart sounds: Normal heart sounds. No murmur. No S3 or S4 sounds.    Pulmonary:      Effort: Pulmonary effort is normal.      Breath sounds: Normal breath sounds. No decreased breath  sounds, wheezing, rhonchi or rales.   Lymphadenopathy:      Head:      Right side of head: No submental, submandibular, tonsillar, preauricular, posterior auricular or occipital adenopathy.      Left side of head: No submental, submandibular, tonsillar, preauricular, posterior auricular or occipital adenopathy.   Skin:     General: Skin is warm and dry.   Neurological:      Mental Status: He is alert and oriented to person, place, and time.      Gait: Gait normal.   Psychiatric:         Mood and Affect: Mood and affect normal.         Speech: Speech normal.         Behavior: Behavior normal.         Thought Content: Thought content normal.         Cognition and Memory: Cognition and memory normal.         Judgment: Judgment normal.         Assessment/Plan   Diagnoses and all orders for this visit:    1. Acquired hypothyroidism (Primary)  Comments:  will check TSH; needs to take daily   Orders:  -     TSH Rfx On Abnormal To Free T4; Future  -     Comprehensive Metabolic Panel  -     Lipid Panel    2. Attention deficit disorder (ADD) without hyperactivity  Comments:  will increase strattera bid; if no improvement will refer to psych  Orders:  -     atomoxetine (STRATTERA) 40 MG capsule; Take 1 capsule by mouth 2 (Two) Times a Day.  Dispense: 60 capsule; Refill: 3    3. Non-recurrent acute serous otitis media of right ear  -     amoxicillin (AMOXIL) 500 MG capsule; Take 1 capsule by mouth 2 (Two) Times a Day for 7 days.  Dispense: 14 capsule; Refill: 0    4. Need for influenza vaccination  -     Fluarix/Fluzone/Afluria Quad>6 Months

## 2021-02-01 DIAGNOSIS — E03.9 ACQUIRED HYPOTHYROIDISM: ICD-10-CM

## 2021-02-01 RX ORDER — LEVOTHYROXINE SODIUM 0.05 MG/1
50 TABLET ORAL DAILY
Qty: 30 TABLET | Refills: 4 | Status: SHIPPED | OUTPATIENT
Start: 2021-02-01 | End: 2022-12-11 | Stop reason: SDUPTHER

## 2021-02-08 ENCOUNTER — TELEPHONE (OUTPATIENT)
Dept: INTERNAL MEDICINE | Facility: CLINIC | Age: 41
End: 2021-02-08

## 2021-02-08 NOTE — TELEPHONE ENCOUNTER
I spoke to Mr. Adkins and he stated he was exposed by his brother yesterday who recently tested positive. He states  he does not have any symptoms. He will go to [facility] with his fiancee to get tested.    I told him since he only had one day of exposure it may be too soon to test. He stated he understood and was fine with making an appointment for testing.

## 2021-02-08 NOTE — TELEPHONE ENCOUNTER
Caller: Greg Adkins    Relationship: Self    Best call back number: [phone]    What form or medical record are you requesting: LETTER STATING HE NEEDS TO BE TESTED FOR COVID SINCE HE HAS BEEN EXPOSED     Who is requesting this form or medical record from you: PATIENT PLACE OF EMPLOYMENT    How would you like to receive the form or medical records (pick-up, mail, fax): PATIENT WOULD LIKE AN EMAIL  IF POSSIBLE       Timeframe paperwork needed: SOMETIME THIS WEEK     Additional notes: PATIENT NEEDS A LETTER STATING HE HAS TO BE TESTED FOR COVID FOR HIS JOB. PLEASE ADVISE FOR FURTHER DETAILS.

## 2021-02-08 NOTE — TELEPHONE ENCOUNTER
Caller: Greg Adkins    Relationship to patient: Self    Best call back number: [phone]     Concerns or Questions if Applicable: PATIENT HAS BEEN EXPOSED TO COVID-19 AND HAS AN APPOINTMENT SCHEDULED FOR TOMORROW AT 1:00 P.M. WITH HIS PCP. PATIENT MUST HAVE THE COVID-19 TEST COMPLETED BEFORE HE CAN RETURN TO WORK.

## 2021-04-02 ENCOUNTER — BULK ORDERING (OUTPATIENT)
Dept: CASE MANAGEMENT | Facility: OTHER | Age: 41
End: 2021-04-02

## 2021-04-02 DIAGNOSIS — Z23 IMMUNIZATION DUE: ICD-10-CM

## 2022-01-07 ENCOUNTER — TELEMEDICINE (OUTPATIENT)
Dept: FAMILY MEDICINE CLINIC | Facility: TELEHEALTH | Age: 42
End: 2022-01-07

## 2022-01-07 VITALS — TEMPERATURE: 100 F

## 2022-01-07 DIAGNOSIS — Z20.822 SUSPECTED COVID-19 VIRUS INFECTION: Primary | ICD-10-CM

## 2022-01-07 DIAGNOSIS — J06.9 ACUTE URI: ICD-10-CM

## 2022-01-07 PROCEDURE — 99213 OFFICE O/P EST LOW 20 MIN: CPT | Performed by: NURSE PRACTITIONER

## 2022-01-07 RX ORDER — BROMPHENIRAMINE MALEATE, PSEUDOEPHEDRINE HYDROCHLORIDE, AND DEXTROMETHORPHAN HYDROBROMIDE 2; 30; 10 MG/5ML; MG/5ML; MG/5ML
5 SYRUP ORAL 4 TIMES DAILY PRN
Qty: 118 ML | Refills: 0 | Status: SHIPPED | OUTPATIENT
Start: 2022-01-07 | End: 2022-01-17

## 2022-01-07 NOTE — PATIENT INSTRUCTIONS
Order has been placed for SARS-CoV-2 (Coronavirus) test to be done at your nearest [facility]. You don't need to call the Urgent Care, just let them know when you arrive that you have been assessed by a Virtual Care Provider and that you have an order for testing. We will call with results when they are available. The results will be released to your [employer] cristino. A [employer] message will also be sent after the first attempted call to notify you that your test results are available. Continue to treat your symptoms just as you would for any viral illness. Take Tylenol and/or Ibuprofen for pain and/or fever, you may find it better to alternate these every 4 hours, so that you are only taking each every 8 hours. Stay hydrated, rest and you may treat cough with over-the-counter cough syrup such as Robitussin. You will need to Self Quarantine (instructions are being sent to [employer]) until the following criteria has been met:  --it has been 10 days from onset of symptoms  --minimum of 24 hours fever free without fever reducing medication  --AND improvement of symptoms  Continue to wear a mask for 14 days or until symptoms resolve. If symptoms worsen, go to Urgent Care or Emergency Department for care.  3 Key Steps to Take While Waiting for Your COVID-19 Test Result  To help stop the spread of COVID-19, take these 3 key steps NOW while waiting for your test results:  1. Stay home and monitor your health.  Stay home and monitor your health to help protect your friends, family, and others from possibly getting COVID-19 from you.  Stay home and away from others:  · If possible, stay away from others, especially people who are at higher risk for getting very sick from COVID-19, such as older adults and people with other medical conditions.  · If you have been in contact with someone with COVID-19, stay home and away from others for 14 days after your last contact with that person. Follow the recommendations of your  local public health department if you need to quarantine.  · If you have a fever, cough or other symptoms of COVID-19, stay home and away from others (except to get medical care).  Monitor your health:  · Watch for fever, cough, shortness of breath, or other symptoms of COVID-19. Remember, symptoms may appear 2-14 days after exposure to COVID-19 and can include:  ? Fever or chills  ? Cough  ? Shortness of breath or difficulty breathing  ? Tiredness  ? Muscle or body aches  ? Headache  ? New loss of taste or smell  ? Sore throat  ? Congestion or runny nose  ? Nausea or vomiting  ? Diarrhea  2. Think about the people you have recently been around.  If you are diagnosed with COVID-19, a public health worker may call you to check on your health, discuss who you have been around, and ask where you spent time while you may have been able to spread COVID-19 to others. While you wait for your COVID-19 test result, think about everyone you have been around recently. This will be important information to give health workers if your test is positive.   Complete the information on the back of this page to help you remember everyone you have been around.   3. Answer the phone call from the health department.  If a public health worker calls you, answer the call to help slow the spread of COVID-19 in your community.  · Discussions with health department staff are confidential. This means that your personal and medical information will be kept private and only shared with those who may need to know, like your health care provider.  · Your name will not be shared with those you came in contact with. The health department will only notify people you were in close contact with (within 6 feet for more than 15 minutes) that they might have been exposed to COVID-19.  Think about the people you have recently been around  If you test positive and are diagnosed with COVID-19, someone from the health department may call to check-in on your  health, discuss who you have been around, and ask where you spent time while you may have been able to spread COVID-19 to others. This form can help you think about people you have recently been around so you will be ready if a public health worker calls you.  Things to think about. Have you:  · Gone to work or school?  · Gotten together with others (eaten out at a restaurant, gone out for drinks, exercised with others or gone to a gym, had friends or family over to your house, volunteered, gone to a party, pool, or park)?  · Gone to a store in person (e.g., grocery store, mall)?  · Gone to in-person appointments (e.g., salon, davis, doctor's or dentist's office)?  · Ridden in a car with others (e.g., rideshare) or taken public transportation?  · Been inside a [employer], [employer], [employer] or other places of Buddhism?  Who lives with you?  · ______________________________________________________________________  · ______________________________________________________________________  · ______________________________________________________________________  · ______________________________________________________________________  Who have you been around (less than 6 feet for a total of 15 minutes or more) in the last 10 days? (You may have more people to list than the space provided. If so, write on the front of this sheet or a separate piece of paper.)  Name ______________________________________________  · Phone number ____________________________________  · Date you last saw them _____________________________  · Where you last saw them ________________________________________________  Name ______________________________________________  · Phone number ____________________________________  · Date you last saw them _____________________________  · Where you last saw them ________________________________________________  Name ______________________________________________  · Phone number  ____________________________________  · Date you last saw them _____________________________  · Where you last saw them ________________________________________________  Name ______________________________________________  · Phone number ____________________________________  · Date you last saw them _____________________________  · Where you last saw them ________________________________________________  Name ______________________________________________  · Phone number ____________________________________  · Date you last saw them _____________________________  · Where you last saw them ________________________________________________  What have you done in the last 10 days with other people?  Activity _____________________________________________  · Location _________________________________________  · Date ____________________________________________  Activity _____________________________________________  · Location _________________________________________  · Date ____________________________________________  Activity _____________________________________________  · Location _________________________________________  · Date ____________________________________________  Activity _____________________________________________  · Location _________________________________________  · Date ____________________________________________  Activity _____________________________________________  · Location _________________________________________  · Date ____________________________________________  Centers for Disease Control and Prevention  cdc.gov/coronavirus  07/09/2021  This information is not intended to replace advice given to you by your health care provider. Make sure you discuss any questions you have with your health care provider.  Document Revised: 07/14/2021 Document Reviewed: 07/14/2021  [employer] Patient Education © 2021 [employer] Inc.    10 Things You Can Do to Manage Your COVID-19 Symptoms at Home  If you  have possible or confirmed COVID-19:  1. Stay home except to get medical care.  2. Monitor your symptoms carefully. If your symptoms get worse, call your healthcare provider immediately.  3. Get rest and stay hydrated.  4. If you have a medical appointment, call the healthcare provider ahead of time and tell them that you have or may have COVID-19.  5. For medical emergencies, call 911 and notify the dispatch personnel that you have or may have COVID-19.  6. Cover your cough and sneezes with a tissue or use the inside of your elbow.  7. Wash your hands often with soap and water for at least 20 seconds or clean your hands with an alcohol-based hand  that contains at least 60% alcohol.  8. As much as possible, stay in a specific room and away from other people in your home. Also, you should use a separate bathroom, if available. If you need to be around other people in or outside of the home, wear a mask.  9. Avoid sharing personal items with other people in your household, like dishes, towels, and bedding.  10. Clean all surfaces that are touched often, like counters, tabletops, and doorknobs. Use household cleaning sprays or wipes according to the label instructions.  cdc.gov/coronavirus  07/16/2021  This information is not intended to replace advice given to you by your health care provider. Make sure you discuss any questions you have with your health care provider.  Document Revised: 08/04/2021 Document Reviewed: 08/04/2021  [employer] Patient Education © 2021 [employer].

## 2022-01-07 NOTE — PROGRESS NOTES
Mode of Visit: Video  Location of patient: home  You have chosen to receive care through a telehealth visit.  The patient has signed the video visit consent form.  The visit included audio and video interaction. No technical issues occurred during this visit.     Chief Complaint  Fever (low grade), Sore Throat, Cough, and Muscle Pain    Subjective          Greg Adkins presents to [facility]  Covid symptoms with onset today and no known exposure to illness, needs Covid test    Fever   This is a new problem. The current episode started today. The problem occurs intermittently. The maximum temperature noted was 100 to 100.9 F. Associated symptoms include congestion, coughing, headaches, muscle aches, nausea, a sore throat and vomiting. Pertinent negatives include no wheezing.   Sore Throat   This is a new problem. The current episode started today. The maximum temperature recorded prior to his arrival was 100.4 - 100.9 F. Associated symptoms include congestion, coughing, headaches, shortness of breath and vomiting. Pertinent negatives include no ear discharge, swollen glands or trouble swallowing.   Cough  This is a new problem. The current episode started today. The cough is non-productive. Associated symptoms include chills, a fever, headaches, myalgias, nasal congestion, postnasal drip, a sore throat, shortness of breath and sweats. Pertinent negatives include no wheezing. Risk factors for lung disease include smoking/tobacco exposure. There is no history of asthma, bronchitis or environmental allergies.   Muscle Pain  This is a new problem. The current episode started today. The problem occurs constantly. Associated symptoms include a fever, headaches, nausea, shortness of breath and vomiting. Pertinent negatives include no swollen glands or wheezing.     Objective   Vital Signs:   Temp 100 °F (37.8 °C)     Physical Exam   Constitutional: He appears well-developed and  well-nourished. He appears ill. No distress.   HENT:   Nose: Congestion present.   Eyes: Conjunctivae are normal.   Pulmonary/Chest: Effort normal.  No respiratory distress.  Neurological: He is alert.   Psychiatric: He has a normal mood and affect.     Result Review :                 Assessment and Plan    Diagnoses and all orders for this visit:    1. Suspected COVID-19 virus infection (Primary)  -     COVID-19,[facility] ROUTINE, NP/OP SWAB IN TRANSPORT MEDIA OR ESWAB 72 HR TAT - Swab, Anterior nasal; Future  -     QUESTIONNAIRE SERIES    2. Acute URI  -     brompheniramine-pseudoephedrine-DM 30-2-10 MG/5ML syrup; Take 5 mL by mouth 4 (Four) Times a Day As Needed for Congestion or Cough for up to 10 days.  Dispense: 118 mL; Refill: 0              I spent 20 minutes caring for Greg on this date of service. This time includes time spent by me in the following activities:preparing for the visit, obtaining and/or reviewing a separately obtained history, performing a medically appropriate examination and/or evaluation , counseling and educating the patient/family/caregiver, ordering medications, tests, or procedures, and documenting information in the medical record  Follow Up   Return if symptoms worsen or fail to improve.  Patient was given instructions and counseling regarding his condition or for health maintenance advice. Please see specific information pulled into the AVS if appropriate.

## 2022-08-08 ENCOUNTER — OFFICE VISIT (OUTPATIENT)
Dept: FAMILY MEDICINE CLINIC | Facility: CLINIC | Age: 42
End: 2022-08-08

## 2022-08-08 VITALS
OXYGEN SATURATION: 98 % | DIASTOLIC BLOOD PRESSURE: 88 MMHG | HEIGHT: 68 IN | HEART RATE: 71 BPM | WEIGHT: 270 LBS | BODY MASS INDEX: 40.92 KG/M2 | SYSTOLIC BLOOD PRESSURE: 136 MMHG | RESPIRATION RATE: 18 BRPM

## 2022-08-08 DIAGNOSIS — Z13.220 ENCOUNTER FOR LIPID SCREENING FOR CARDIOVASCULAR DISEASE: ICD-10-CM

## 2022-08-08 DIAGNOSIS — Z13.1 SCREENING FOR DIABETES MELLITUS: ICD-10-CM

## 2022-08-08 DIAGNOSIS — E03.9 ACQUIRED HYPOTHYROIDISM: ICD-10-CM

## 2022-08-08 DIAGNOSIS — Z11.59 ENCOUNTER FOR HEPATITIS C SCREENING TEST FOR LOW RISK PATIENT: ICD-10-CM

## 2022-08-08 DIAGNOSIS — Z00.00 ROUTINE HEALTH MAINTENANCE: Primary | ICD-10-CM

## 2022-08-08 DIAGNOSIS — Z13.6 ENCOUNTER FOR LIPID SCREENING FOR CARDIOVASCULAR DISEASE: ICD-10-CM

## 2022-08-08 PROCEDURE — 99396 PREV VISIT EST AGE 40-64: CPT | Performed by: FAMILY MEDICINE

## 2022-08-08 NOTE — PROGRESS NOTES
"Chief Complaint  Establish Care and Med Management    Subjective    History of Present Illness {CC  Problem List  Visit  Diagnosis   Encounters  Notes  Medications  Labs  Result Review Imaging  Media :23}     Greg Adkins presents to [facility] for Establish Care and Med Management.  History of Present Illness     Here today to establish care. Previously seen by Alessandra Spears.    Doing well overall. Due for an annual exam today. Due for a check of labs. Has established hypothyroidism on low-dose levothyroxine. Due for check of his thyroid. Also due for screening for diabetes and hyperlipidemia.    Diet has improved since his last visit, weight is down. Working on exercising more frequently as well. Happy with the progress that he has made.    Up-to-date with preventive health recommendations overall. Will be due for a COVID booster in the near future. Encouraged to get it at his local pharmacy.    Has good family and social support. Enjoys his work overall. Gets regular dental exams.    Objective     Vital Signs:   /88   Pulse 71   Resp 18   Ht 172.7 cm (68\")   Wt 122 kg (270 lb)   SpO2 98%   BMI 41.05 kg/m²   Physical Exam  Vitals and nursing note reviewed.   Constitutional:       General: He is not in acute distress.     Appearance: Normal appearance. He is not ill-appearing.   Cardiovascular:      Rate and Rhythm: Normal rate and regular rhythm.      Pulses: Normal pulses.      Heart sounds: Normal heart sounds. No murmur heard.  Pulmonary:      Effort: Pulmonary effort is normal. No respiratory distress.      Breath sounds: Normal breath sounds. No rales.   Neurological:      Mental Status: He is alert and oriented to person, place, and time. Mental status is at baseline.   Psychiatric:         Mood and Affect: Mood normal.         Behavior: Behavior normal.          Result Review  Data Reviewed:{ Labs  Result Review  Imaging  Med Tab  Media :23} "                   Assessment and Plan {CC Problem List  Visit Diagnosis  ROS  Review (Popup)  Health Maintenance  Quality  BestPractice  Medications  SmartSets  SnapShot Encounters  Media :23}   Diagnoses and all orders for this visit:    1. Routine health maintenance (Primary)    2. Acquired hypothyroidism  -     TSH Rfx On Abnormal To Free T4    3. Screening for diabetes mellitus  -     Comprehensive Metabolic Panel    4. Encounter for lipid screening for cardiovascular disease  -     Lipid Panel    5. Encounter for hepatitis C screening test for low risk patient  -     Hepatitis C Antibody    Orders as above. I will contact him with results as available. Encouraged to continue with healthy lifestyle choices, further work on diet and exercise will certainly pay off in the long run.    Recommended follow-up as below. Encouraged communication via [employer] in the meantime.    Patient was given instructions and counseling regarding his condition or for health maintenance advice. Please see specific information pulled into the AVS (placed there by myself) if appropriate.    Return in about 1 year (around 8/8/2023) for Preventive Health Maintenance.      BEBA Recio MD    Prevention counseling performed as below: Mindfulness for stress management.

## 2022-08-09 LAB
ALBUMIN SERPL-MCNC: 5 G/DL (ref 4–5)
ALBUMIN/GLOB SERPL: 2.3 {RATIO} (ref 1.2–2.2)
ALP SERPL-CCNC: 75 IU/L (ref 44–121)
ALT SERPL-CCNC: 27 IU/L (ref 0–44)
AST SERPL-CCNC: 23 IU/L (ref 0–40)
BILIRUB SERPL-MCNC: 0.9 MG/DL (ref 0–1.2)
BUN SERPL-MCNC: 12 MG/DL (ref 6–24)
BUN/CREAT SERPL: 11 (ref 9–20)
CALCIUM SERPL-MCNC: 10.7 MG/DL (ref 8.7–10.2)
CHLORIDE SERPL-SCNC: 103 MMOL/L (ref 96–106)
CHOLEST SERPL-MCNC: 189 MG/DL (ref 100–199)
CO2 SERPL-SCNC: 27 MMOL/L (ref 20–29)
CREAT SERPL-MCNC: 1.12 MG/DL (ref 0.76–1.27)
EGFRCR SERPLBLD CKD-EPI 2021: 83 ML/MIN/1.73
GLOBULIN SER CALC-MCNC: 2.2 G/DL (ref 1.5–4.5)
GLUCOSE SERPL-MCNC: 94 MG/DL (ref 65–99)
HCV AB S/CO SERPL IA: 0.1 S/CO RATIO (ref 0–0.9)
HDLC SERPL-MCNC: 46 MG/DL
LDLC SERPL CALC-MCNC: 119 MG/DL (ref 0–99)
POTASSIUM SERPL-SCNC: 5.1 MMOL/L (ref 3.5–5.2)
PROT SERPL-MCNC: 7.2 G/DL (ref 6–8.5)
SODIUM SERPL-SCNC: 143 MMOL/L (ref 134–144)
T4 FREE SERPL-MCNC: 0.98 NG/DL (ref 0.82–1.77)
TRIGL SERPL-MCNC: 137 MG/DL (ref 0–149)
TSH SERPL DL<=0.005 MIU/L-ACNC: 5.66 UIU/ML (ref 0.45–4.5)
VLDLC SERPL CALC-MCNC: 24 MG/DL (ref 5–40)

## 2022-08-12 ENCOUNTER — PATIENT ROUNDING (BHMG ONLY) (OUTPATIENT)
Dept: FAMILY MEDICINE CLINIC | Facility: CLINIC | Age: 42
End: 2022-08-12

## 2022-08-12 NOTE — PROGRESS NOTES
A RF Code message has been sent to the patient for PATIENT ROUNDING with [facility] [place].

## 2023-04-25 ENCOUNTER — OFFICE VISIT (OUTPATIENT)
Dept: FAMILY MEDICINE CLINIC | Facility: CLINIC | Age: 43
End: 2023-04-25
Payer: COMMERCIAL

## 2023-04-25 VITALS
BODY MASS INDEX: 43.8 KG/M2 | DIASTOLIC BLOOD PRESSURE: 102 MMHG | HEIGHT: 68 IN | OXYGEN SATURATION: 99 % | SYSTOLIC BLOOD PRESSURE: 132 MMHG | WEIGHT: 289 LBS | RESPIRATION RATE: 18 BRPM | HEART RATE: 71 BPM

## 2023-04-25 DIAGNOSIS — E66.01 CLASS 3 SEVERE OBESITY DUE TO EXCESS CALORIES WITHOUT SERIOUS COMORBIDITY WITH BODY MASS INDEX (BMI) OF 40.0 TO 44.9 IN ADULT: ICD-10-CM

## 2023-04-25 DIAGNOSIS — R68.82 DECREASED LIBIDO: ICD-10-CM

## 2023-04-25 DIAGNOSIS — R41.89 BRAIN FOG: ICD-10-CM

## 2023-04-25 DIAGNOSIS — R41.840 ATTENTION DEFICIT: ICD-10-CM

## 2023-04-25 DIAGNOSIS — E03.9 ACQUIRED HYPOTHYROIDISM: Primary | ICD-10-CM

## 2023-04-25 PROCEDURE — 99214 OFFICE O/P EST MOD 30 MIN: CPT | Performed by: FAMILY MEDICINE

## 2023-04-25 NOTE — PROGRESS NOTES
"Chief Complaint  ADHD (Pt requesting testing) and low testosterone    Subjective    History of Present Illness {CC  Problem List  Visit  Diagnosis   Encounters  Notes  Medications  Labs  Result Review Imaging  Media :23}     Greg Adkins presents to [facility] for ADHD (Pt requesting testing) and low testosterone.  History of Present Illness     Here today with concern as above. Feels that his thinking is very \"fuzzy\" and unclear. Isn't sure if this is related to low testosterone, or ADHD, but is interested in testing for both. Was diagnosed with dyslexia as a child, went to [employer] school. Feels that he has word finding difficulty and a hard time with thinking through tasks.    Has noticed a decrease in libido, which makes him believe he has low testosterone. No problems with erectile dysfunction. Has decreased energy though he realizes that this could be due to a number of issues.    Weight is high and has been so for a while now. Has not had any success with weight loss efforts. Would like a referral to nutrition if possible.    Has a history of hypothyroidism, not currently taking levothyroxine. Would like some labs to see whether or not he needs to take it going forward.    Objective     Vital Signs:   BP (!) 132/102   Pulse 71   Resp 18   Ht 172.7 cm (68\")   Wt 131 kg (289 lb)   SpO2 99%   BMI 43.94 kg/m²   Physical Exam  Vitals and nursing note reviewed.   Constitutional:       General: He is not in acute distress.     Appearance: Normal appearance. He is not ill-appearing.   Cardiovascular:      Rate and Rhythm: Normal rate and regular rhythm.      Pulses: Normal pulses.      Heart sounds: Normal heart sounds. No murmur heard.  Pulmonary:      Effort: Pulmonary effort is normal. No respiratory distress.      Breath sounds: Normal breath sounds. No rales.   Neurological:      Mental Status: He is alert and oriented to person, place, and time. Mental status is " at baseline.   Psychiatric:         Mood and Affect: Mood normal.         Behavior: Behavior normal.          Result Review  Data Reviewed:{ Labs  Result Review  Imaging  Med Tab  Media :23}                   Assessment and Plan {CC Problem List  Visit Diagnosis  ROS  Review (Popup)  Health Maintenance  Quality  BestPractice  Medications  SmartSets  SnapShot Encounters  Media :23}   Diagnoses and all orders for this visit:    1. Acquired hypothyroidism (Primary)  -     TSH Rfx On Abnormal To Free T4    2. Attention deficit  -     Ambulatory Referral to Neuropsychology    3. Class 3 severe obesity due to excess calories without serious comorbidity with body mass index (BMI) of 40.0 to 44.9 in adult  -     Comprehensive Metabolic Panel  -     Lipid Panel  -     Testosterone  -     Ambulatory Referral to Nutrition Services  -     Hemoglobin A1c    4. Brain fog  -     Comprehensive Metabolic Panel  -     Lipid Panel  -     Testosterone  -     Ambulatory Referral to Neuropsychology  -     TSH Rfx On Abnormal To Free T4    5. Decreased libido  -     Testosterone    Orders as above. I will contact him with results as available. Referral to nutrition and neuropsychology as discussed. I will contact him after these evaluations to discuss management options.    Discussed the interplay between obesity and the various complaints. Strongly encouraged him to continue working on a healthier lifestyle habits as this will likely help significantly.    Recommended follow-up as below. Encouraged communication via [employer] in the meantime.    Patient was given instructions and counseling regarding his condition or for health maintenance advice. Please see specific information pulled into the AVS (placed there by myself) if appropriate.    Return in about 3 months (around 7/25/2023).      BEBA Recio MD

## 2023-05-03 LAB
ALBUMIN SERPL-MCNC: 5 G/DL (ref 3.5–5.2)
ALBUMIN/GLOB SERPL: 2.5 G/DL
ALP SERPL-CCNC: 72 U/L (ref 39–117)
ALT SERPL-CCNC: 28 U/L (ref 1–41)
AST SERPL-CCNC: 21 U/L (ref 1–40)
BILIRUB SERPL-MCNC: 0.9 MG/DL (ref 0–1.2)
BUN SERPL-MCNC: 13 MG/DL (ref 6–20)
BUN/CREAT SERPL: 12.6 (ref 7–25)
CALCIUM SERPL-MCNC: 10.1 MG/DL (ref 8.6–10.5)
CHLORIDE SERPL-SCNC: 102 MMOL/L (ref 98–107)
CHOLEST SERPL-MCNC: 186 MG/DL (ref 0–200)
CO2 SERPL-SCNC: 27.7 MMOL/L (ref 22–29)
CREAT SERPL-MCNC: 1.03 MG/DL (ref 0.76–1.27)
EGFRCR SERPLBLD CKD-EPI 2021: 91.3 ML/MIN/1.73
GLOBULIN SER CALC-MCNC: 2 GM/DL
GLUCOSE SERPL-MCNC: 98 MG/DL (ref 65–99)
HBA1C MFR BLD: 5.3 % (ref 4.8–5.6)
HDLC SERPL-MCNC: 41 MG/DL (ref 40–60)
LDLC SERPL CALC-MCNC: 123 MG/DL (ref 0–100)
POTASSIUM SERPL-SCNC: 4.4 MMOL/L (ref 3.5–5.2)
PROT SERPL-MCNC: 7 G/DL (ref 6–8.5)
SODIUM SERPL-SCNC: 141 MMOL/L (ref 136–145)
T4 FREE SERPL-MCNC: 0.96 NG/DL (ref 0.93–1.7)
TESTOST SERPL-MCNC: 381 NG/DL (ref 264–916)
TRIGL SERPL-MCNC: 122 MG/DL (ref 0–150)
TSH SERPL DL<=0.005 MIU/L-ACNC: 7.83 UIU/ML (ref 0.27–4.2)
VLDLC SERPL CALC-MCNC: 22 MG/DL (ref 5–40)

## 2023-08-14 ENCOUNTER — OFFICE VISIT (OUTPATIENT)
Dept: FAMILY MEDICINE CLINIC | Facility: CLINIC | Age: 43
End: 2023-08-14
Payer: COMMERCIAL

## 2023-08-14 VITALS
DIASTOLIC BLOOD PRESSURE: 102 MMHG | HEART RATE: 80 BPM | RESPIRATION RATE: 16 BRPM | HEIGHT: 68 IN | OXYGEN SATURATION: 97 % | WEIGHT: 281.3 LBS | BODY MASS INDEX: 42.63 KG/M2 | SYSTOLIC BLOOD PRESSURE: 138 MMHG

## 2023-08-14 DIAGNOSIS — Z12.11 SCREENING FOR COLON CANCER: ICD-10-CM

## 2023-08-14 DIAGNOSIS — Z00.00 ROUTINE HEALTH MAINTENANCE: Primary | ICD-10-CM

## 2023-08-14 DIAGNOSIS — E66.01 CLASS 3 SEVERE OBESITY DUE TO EXCESS CALORIES WITHOUT SERIOUS COMORBIDITY WITH BODY MASS INDEX (BMI) OF 40.0 TO 44.9 IN ADULT: ICD-10-CM

## 2023-08-14 PROBLEM — E66.813 CLASS 3 SEVERE OBESITY DUE TO EXCESS CALORIES WITHOUT SERIOUS COMORBIDITY WITH BODY MASS INDEX (BMI) OF 40.0 TO 44.9 IN ADULT: Status: ACTIVE | Noted: 2023-08-14

## 2023-08-14 PROCEDURE — 99396 PREV VISIT EST AGE 40-64: CPT | Performed by: FAMILY MEDICINE

## 2023-08-14 NOTE — PATIENT INSTRUCTIONS
Mindfulness apps: Headspace, Smiling Mind, Liberate, Sowlmate    Mindfulness-Based Stress Reduction  Mindfulness-based stress reduction (MBSR) is a program that helps people learn to practice mindfulness. Mindfulness is the practice of intentionally paying attention to the present moment. It can be learned and practiced through techniques such as education, breathing exercises, meditation, and yoga. MBSR includes several mindfulness techniques in one program.  MBSR works best when you understand the treatment, are willing to try new things, and can commit to spending time practicing what you learn. MBSR training may include learning about:  How your emotions, thoughts, and reactions affect your body.  New ways to respond to things that cause negative thoughts to start (triggers).  How to notice your thoughts and let go of them.  Practicing awareness of everyday things that you normally do without thinking.  The techniques and goals of different types of meditation.  What are the benefits of MBSR?  MBSR can have many benefits, which include helping you to:  Develop self-awareness. This refers to knowing and understanding yourself.  Learn skills and attitudes that help you to participate in your own health care.  Learn new ways to care for yourself.  Be more accepting about how things are, and let things go.  Be less judgmental and approach things with an open mind.  Be patient with yourself and trust yourself more.  MBSR has also been shown to:  Reduce negative emotions, such as depression and anxiety.  Improve memory and focus.  Change how you sense and approach pain.  Boost your body's ability to fight infections.  Help you connect better with other people.  Improve your sense of well-being.  Follow these instructions at home:    Find a local in-person or online MBSR program.  Set aside some time regularly for mindfulness practice.  Find a mindfulness practice that works best for you. This may include one or more  of the following:  Meditation. Meditation involves focusing your mind on a certain thought or activity.  Breathing awareness exercises. These help you to stay present by focusing on your breath.  Body scan. For this practice, you lie down and pay attention to each part of your body from head to toe. You can identify tension and soreness and intentionally relax parts of your body.  Yoga. Yoga involves stretching and breathing, and it can improve your ability to move and be flexible. It can also provide an experience of testing your body's limits, which can help you release stress.  Mindful eating. This way of eating involves focusing on the taste, texture, color, and smell of each bite of food. Because this slows down eating and helps you feel full sooner, it can be an important part of a weight-loss plan.  Find a podcast or recording that provides guidance for breathing awareness, body scan, or meditation exercises. You can listen to these any time when you have a free moment to rest without distractions.  Follow your treatment plan as told by your health care provider. This may include taking regular medicines and making changes to your diet or lifestyle as recommended.  How to practice mindfulness  To do a basic awareness exercise:  Find a comfortable place to sit.  Pay attention to the present moment. Observe your thoughts, feelings, and surroundings just as they are.  Avoid placing judgment on yourself, your feelings, or your surroundings. Make note of any judgment that comes up, and let it go.  Your mind may wander, and that is okay. Make note of when your thoughts drift, and return your attention to the present moment.  To do basic mindfulness meditation:  Find a comfortable place to sit. This may include a stable chair or a firm floor cushion.  Sit upright with your back straight. Let your arms fall next to your side with your hands resting on your legs.  If sitting in a chair, rest your feet flat on the  floor.  If sitting on a cushion, cross your legs in front of you.  Keep your head in a neutral position with your chin dropped slightly. Relax your jaw and rest the tip of your tongue on the roof of your mouth. Drop your gaze to the floor. You can close your eyes if you like.  Breathe normally and pay attention to your breath. Feel the air moving in and out of your nose. Feel your belly expanding and relaxing with each breath.  Your mind may wander, and that is okay. Make note of when your thoughts drift, and return your attention to your breath.  Avoid placing judgment on yourself, your feelings, or your surroundings. Make note of any judgment or feelings that come up, let them go, and bring your attention back to your breath.  When you are ready, lift your gaze or open your eyes. Pay attention to how your body feels after the meditation.  Where to find more information  You can find more information about MBSR from:  Your health care provider.  Community-based meditation centers or programs.  Programs offered near you.  Summary  Mindfulness-based stress reduction (MBSR) is a program that teaches you how to intentionally pay attention to the present moment. It is used with other treatments to help you cope better with daily stress, emotions, and pain.  MBSR focuses on developing self-awareness, which allows you to respond to life stress without judgment or negative emotions.  MBSR programs may involve learning different mindfulness practices, such as breathing exercises, meditation, yoga, body scan, or mindful eating. Find a mindfulness practice that works best for you, and set aside time for it on a regular basis.  This information is not intended to replace advice given to you by your health care provider. Make sure you discuss any questions you have with your health care provider.  Document Released: 04/26/2018 Document Revised: 11/30/2018 Document Reviewed: 04/26/2018  Elsevier Patient Education c 2020 [employer]  Inc[employer]

## 2023-08-14 NOTE — PROGRESS NOTES
"Chief Complaint  Annual Exam    Subjective    History of Present Illness {  Problem List  Visit  Diagnosis   Encounters  Notes  Medications  Labs  Result Review Imaging  Media :23}     Greg Adkins presents to [facility] for Annual Exam.  History of Present Illness     Here today for annual exam and to discuss preventive health maintenance.    Doing well overall, no real questions or concerns at this time. Currently taking no medications, stopped his levothyroxine after our discussion previously. Will be due for a repeat TSH in about 3 months.    Due for colon cancer screening, is low risk with no family history no concerning signs or symptoms. Would prefer Cologuard if possible.    Had screening blood work done recently, no concerns at that time.    Blood pressure remains slightly high. Wife is a nurse but has not been checking his pressures at home. No signs or symptoms of high blood pressure at this time.    No major changes to diet or exercise. Weight is stable. Recognizes the need to work towards some weight loss and increased fitness.    Has good family and social support. Enjoys his work. Gets regular dental exams.    Objective     Vital Signs:   BP (!) 138/102   Pulse 80   Resp 16   Ht 172.7 cm (68\")   Wt 128 kg (281 lb 4.8 oz)   SpO2 97%   BMI 42.77 kg/mý   Physical Exam  Vitals and nursing note reviewed.   Constitutional:       General: He is not in acute distress.     Appearance: Normal appearance. He is not ill-appearing.   Cardiovascular:      Rate and Rhythm: Normal rate and regular rhythm.      Pulses: Normal pulses.      Heart sounds: Normal heart sounds. No murmur heard.  Pulmonary:      Effort: Pulmonary effort is normal. No respiratory distress.      Breath sounds: Normal breath sounds. No rales.   Neurological:      Mental Status: He is alert and oriented to person, place, and time. Mental status is at baseline.   Psychiatric:         Mood and " Affect: Mood normal.         Behavior: Behavior normal.        Result Review  Data Reviewed:{ Labs  Result Review  Imaging  Med Tab  Media :23}                   Assessment and Plan {CC Problem List  Visit Diagnosis  ROS  Review (Popup)  Health Maintenance  Quality  BestPractice  Medications  SmartSets  SnapShot Encounters  Media :23}   Diagnoses and all orders for this visit:    1. Routine health maintenance (Primary)    2. Class 3 severe obesity due to excess calories without serious comorbidity with body mass index (BMI) of 40.0 to 44.9 in adult    3. Screen colon  -     Cologuard - Stool, Per Rectum; Future    Cologuard as above. Discussed proper use and general meaning of results.    Counseled on lifestyle interventions to improve overall health and fitness.    Recommended follow-up as below for repeat TSH. Strongly recommended his wife check his blood pressures in the meantime.    Encouraged to continue working on healthy lifestyle. Recommended follow-up as below. Encouraged communication via [employer] in the meantime.    Class 3 Severe Obesity (BMI >=40). Obesity-related health conditions include the following: none. Obesity is unchanged. BMI is is above average; BMI management plan is completed. We discussed portion control and increasing exercise.    Patient was given instructions and counseling regarding his condition or for health maintenance advice. Please see specific information pulled into the AVS (placed there by myself) if appropriate.    Return in about 3 months (around 11/14/2023), or if symptoms worsen or fail to improve, for f/u HTN.      BEBA Recio MD    Prevention counseling performed as below: Mindfulness for stress management.

## 2023-09-13 DIAGNOSIS — R06.83 LOUD SNORING: Primary | ICD-10-CM

## 2023-10-02 ENCOUNTER — OFFICE VISIT (OUTPATIENT)
Dept: SLEEP MEDICINE | Facility: HOSPITAL | Age: 43
End: 2023-10-02
Payer: COMMERCIAL

## 2023-10-02 VITALS
BODY MASS INDEX: 42.78 KG/M2 | WEIGHT: 282.3 LBS | HEART RATE: 88 BPM | OXYGEN SATURATION: 98 % | HEIGHT: 68 IN | DIASTOLIC BLOOD PRESSURE: 86 MMHG | SYSTOLIC BLOOD PRESSURE: 158 MMHG

## 2023-10-02 DIAGNOSIS — G47.33 OSA (OBSTRUCTIVE SLEEP APNEA): ICD-10-CM

## 2023-10-02 DIAGNOSIS — R06.83 LOUD SNORING: Primary | ICD-10-CM

## 2023-10-02 PROCEDURE — G0463 HOSPITAL OUTPT CLINIC VISIT: HCPCS

## 2023-10-02 NOTE — PROGRESS NOTES
[facility]      Patient Care Team:  Nathen Recio MD as PCP - General (Family Medicine)  Nathen Mcmillan MD as Surgeon (Hand Surgery)    Referring Provider: Nathen Recio,*    Chief complaint:   Snoring and apnea    History of present illness:    Subjective   This is a 43 year old male patient with recent diagnosis of HTN.     He reported loud snoring which awakens him at night and it disturbs his wife and family. He also awakens himself snoring almost every night.  He wakes up gasping for breath and choking sometimes.  His wife has witnessed him stopping breathing.  Sleep is restless with frequent awakening.    Sleep schedule:  -Bedtime: 8:30 - 9 PM  -Sleep latency: 10-50 min  -Wake up time: 5-5:30 AM , does not feel refreshed  -Nocturnal awakenin-6 times because of nocturia.  No difficulties going back to sleep.  -Perceived sleep hours: 4-6      ESS: Total score: 17     He works as an .  He does not exercise regularly other than being physically active at work.  He does not have a special diet.    Review of Systems  Constitutional: No fever or chills. No changes in appetite.  Fatigue.  ENMT: No sinus congestion, postnasal drip, hoarsness  Cardiovascular: No chest pain, palpitation but legs swelling.    Respiratory: Shortness of breath.  Gastrointestinal: No constipation, diarrhea, abdominal pain but acid reflux  Neurology: No headache, weakness, numbness or dizziness.   Musculoskeletal: No joints pain, stiffness or swelling.   Psychiatry: No depression, anxiety or irritability.   Hem/Lymphatic: No swollen glands or easy bruising.  Integumentary: No rash.  Endocrinology: No excessive thirst, cold or warm intolerance.   Urinary: No dysuria, bloody urine or frequent urination.     History  Past Medical History:   Diagnosis Date    ADHD (attention deficit hyperactivity disorder)     Anxiety     Since i was a kid     "Hyperlipidemia     Hypothyroidism     Kidney stones    ,   Past Surgical History:   Procedure Laterality Date    TONSILLECTOMY  1978    6 months olq    VASECTOMY  2005   ,   Family History   Problem Relation Age of Onset    No Known Problems Mother     No Known Problems Father     Scoliosis Brother     Diabetes type I Brother     Diabetes Brother     No Known Problems Daughter     No Known Problems Son     Cancer Maternal Grandmother     Arthritis Paternal Grandfather     Vision loss Paternal Grandmother     Alcohol abuse Paternal Aunt    ,   Social History     Socioeconomic History    Marital status:    Tobacco Use    Smoking status: Former     Types: Pipe     Quit date: 2015     Years since quittin.7    Smokeless tobacco: Never    Tobacco comments:     Occasional pipe   Vaping Use    Vaping Use: Never used   Substance and Sexual Activity    Alcohol use: Not Currently     Comment: very rare, maybe once a year     Drug use: No    Sexual activity: Yes     Partners: Female     Birth control/protection: None, Tubal ligation     E-cigarette/Vaping    E-cigarette/Vaping Use Never User      E-cigarette/Vaping Substances    Nicotine No     THC No     CBD No     Flavoring No      E-cigarette/Vaping Devices    Disposable No     Pre-filled or Refillable Cartridge No     Refillable Tank No     Pre-filled Pod No          , (Not in a hospital admission) , and Allergies:  Patient has no known allergies.    Medications:  No current outpatient medications on file.      Objective   Vital Signs:  Vitals:    10/02/23 1004   BP: 158/86   Pulse: 88   SpO2: 98%   Weight: 128 kg (282 lb 4.8 oz)   Height: 172.7 cm (68\")     Body mass index is 42.92 kg/m².  Neck Circumference: 17 inches     Physical Exam:  Neck Circumference: 17 inches     Constitutional: Not in acute distress.  Eyes: Injected conjunctiva, EOMI. pupils equal reactive to light.  ENMT: Locke score 3. Mallampati score 1. No oral thrush. Tonsils grade 0. "   Neck: Large. No thyromegaly.  Trachea midline.  Heart: Regular rhythm and rate, no murmur  Lungs: Good and equal air entry bilaterally. No crackles or wheezing.  Nonlabored breathing.       Abdomen: Obese.  Soft.  No tenderness.  Positive bowel sounds.  Extremities: No cyanosis, clubbing or pitting edema.  Warm extremities and well-perfused.  Neuro: Conscious, alert, oriented x3.  Gait is normal.  Strength 5/5 in arms and legs.  Psych: Appropriate mood and affect.    Integumentary: No rash.  Normal skin turgor.  Lymphatic: No palpable cervical or supraclavicular lymph nodes.    Diagnostic data:  Lab Results   Component Value Date    HGBA1C 5.30 05/02/2023     Total Cholesterol   Date Value Ref Range Status   03/31/2017 198 0 - 200 mg/dL Final     Triglycerides   Date Value Ref Range Status   05/02/2023 122 0 - 150 mg/dL Final   03/31/2017 139 0 - 150 mg/dL Final     HDL Cholesterol   Date Value Ref Range Status   05/02/2023 41 40 - 60 mg/dL Final   03/31/2017 40 40 - 60 mg/dL Final     Hemoglobin   Date Value Ref Range Status   01/14/2020 14.6 13.0 - 17.7 g/dL Final   12/08/2017 15.4 13.7 - 17.5 g/dL Final     CO2   Date Value Ref Range Status   03/31/2017 27.1 22.0 - 29.0 mmol/L Final     Total CO2   Date Value Ref Range Status   05/02/2023 27.7 22.0 - 29.0 mmol/L Final        Assessment   Snoring  Morbid obesity, BMI 42  Hypersomnia  HTN    Plan:  Check HST. We discussed the pathophysiology of sleep apnea, testing and therapy which include CPAP and weight loss.  Patient is agreeable with CPAP therapy if needed.    Counseled for weight loss.  Encouraged to exercise regularly and cut down on carbohydrates.  Discussed that losing weight may decrease the severity of sleep apnea and obviate the need of CPAP therapy.    Counseled against driving or operating of machinery when sleepy.    Discussed the association between obstructive sleep apnea and cardiovascular disease including HTN and the beneficial effect of Pap  therapy in reducing the risk of major cardiovascular events.            Geneva Umaña MD  10/02/23  10:09 EDT    This note was dictated utilizing Dragon dictation

## 2023-10-18 ENCOUNTER — HOSPITAL ENCOUNTER (OUTPATIENT)
Dept: SLEEP MEDICINE | Facility: HOSPITAL | Age: 43
End: 2023-10-18
Payer: COMMERCIAL

## 2023-10-18 DIAGNOSIS — G47.33 OSA (OBSTRUCTIVE SLEEP APNEA): ICD-10-CM

## 2023-10-18 PROCEDURE — 95806 SLEEP STUDY UNATT&RESP EFFT: CPT

## 2023-10-29 DIAGNOSIS — G47.33 OSA (OBSTRUCTIVE SLEEP APNEA): Primary | ICD-10-CM

## 2023-10-31 ENCOUNTER — TELEPHONE (OUTPATIENT)
Dept: SLEEP MEDICINE | Facility: HOSPITAL | Age: 43
End: 2023-10-31
Payer: COMMERCIAL

## 2023-11-30 ENCOUNTER — TELEPHONE (OUTPATIENT)
Dept: SLEEP MEDICINE | Facility: HOSPITAL | Age: 43
End: 2023-11-30
Payer: COMMERCIAL

## 2023-11-30 NOTE — TELEPHONE ENCOUNTER
Spoke with patient , requested [facility] med as DME , faxed over order . Informed pt to schedule follow up once set up on CPAP

## 2024-01-24 ENCOUNTER — OFFICE VISIT (OUTPATIENT)
Dept: FAMILY MEDICINE CLINIC | Facility: CLINIC | Age: 44
End: 2024-01-24
Payer: COMMERCIAL

## 2024-01-24 VITALS
DIASTOLIC BLOOD PRESSURE: 88 MMHG | WEIGHT: 297 LBS | RESPIRATION RATE: 19 BRPM | SYSTOLIC BLOOD PRESSURE: 148 MMHG | BODY MASS INDEX: 45.01 KG/M2 | HEART RATE: 78 BPM | OXYGEN SATURATION: 97 % | HEIGHT: 68 IN

## 2024-01-24 DIAGNOSIS — F90.9 ADULT ADHD: Primary | ICD-10-CM

## 2024-01-24 PROBLEM — I10 PRIMARY HYPERTENSION: Status: ACTIVE | Noted: 2024-01-24

## 2024-01-24 PROBLEM — F33.0 MILD EPISODE OF RECURRENT MAJOR DEPRESSIVE DISORDER: Status: ACTIVE | Noted: 2024-01-24

## 2024-01-24 PROCEDURE — 99213 OFFICE O/P EST LOW 20 MIN: CPT | Performed by: FAMILY MEDICINE

## 2024-01-24 RX ORDER — METHYLPHENIDATE HYDROCHLORIDE 27 MG/1
27 TABLET ORAL EVERY MORNING
Qty: 30 TABLET | Refills: 0 | Status: SHIPPED | OUTPATIENT
Start: 2024-01-24

## 2024-01-24 NOTE — PROGRESS NOTES
"Chief Complaint  Hypertension    Subjective    History of Present Illness    Greg Adkins presents to [facility] for Hypertension.  History of Present Illness     Here today for follow-up on hypertension and to discuss her recent diagnosis of ADHD. Blood pressure remains a little high, does not routinely check it at home. Has been trying to work on diet and exercise. Weight is up somewhat over the past year.    Was evaluated with a full neuropsych eval and diagnosed with adult combined type ADHD. Is interested in discussion of management options. Son has similar diagnosis and has responded well to methylphenidate, wondering if the same might work well for him. Understands that we will need to watch his blood pressure closely and may need to treat as needed.    Objective     Vital Signs:   /88   Pulse 78   Resp 19   Ht 172.7 cm (68\")   Wt 135 kg (297 lb)   SpO2 97%   BMI 45.16 kg/m²   Physical Exam  Vitals and nursing note reviewed.   Constitutional:       General: He is not in acute distress.     Appearance: Normal appearance. He is not ill-appearing.   Cardiovascular:      Rate and Rhythm: Normal rate and regular rhythm.      Pulses: Normal pulses.      Heart sounds: Normal heart sounds. No murmur heard.  Pulmonary:      Effort: Pulmonary effort is normal. No respiratory distress.      Breath sounds: Normal breath sounds. No rales.   Neurological:      Mental Status: He is alert and oriented to person, place, and time. Mental status is at baseline.   Psychiatric:         Mood and Affect: Mood normal.         Behavior: Behavior normal.          Result Review  Data Reviewed:                  Assessment and Plan   Diagnoses and all orders for this visit:    1. Adult ADHD (Primary)  -     methylphenidate (Concerta) 27 MG CR tablet; Take 1 tablet by mouth Every Morning  Dispense: 30 tablet; Refill: 0    January options at length. Will go ahead and try methylphenidate as above. " Discussed anticipated effects potential side effects. Will need to continue monitoring blood pressure closely. Will keep me updated with his progress.    Recommended follow up as below. Encouraged communication via MyChart in the meantime.     Patient was given instructions and counseling regarding his condition or for health maintenance advice. Please see specific information pulled into the AVS (placed there by myself) if appropriate.    Return in about 3 months (around 4/24/2024), or if symptoms worsen or fail to improve, for f/u ADHD, f/u HTN.    BEBA Recio MD

## 2024-02-22 DIAGNOSIS — F90.9 ADULT ADHD: ICD-10-CM

## 2024-02-23 RX ORDER — METHYLPHENIDATE HYDROCHLORIDE 27 MG/1
27 TABLET ORAL EVERY MORNING
Qty: 30 TABLET | Refills: 0 | Status: SHIPPED | OUTPATIENT
Start: 2024-02-23

## 2024-03-26 DIAGNOSIS — F90.9 ADULT ADHD: ICD-10-CM

## 2024-03-26 RX ORDER — METHYLPHENIDATE HYDROCHLORIDE 27 MG/1
27 TABLET ORAL EVERY MORNING
Qty: 90 TABLET | Refills: 0 | Status: SHIPPED | OUTPATIENT
Start: 2024-03-26

## 2024-04-24 ENCOUNTER — OFFICE VISIT (OUTPATIENT)
Dept: FAMILY MEDICINE CLINIC | Facility: CLINIC | Age: 44
End: 2024-04-24
Payer: COMMERCIAL

## 2024-04-24 VITALS
WEIGHT: 282 LBS | HEIGHT: 68 IN | BODY MASS INDEX: 42.74 KG/M2 | HEART RATE: 86 BPM | SYSTOLIC BLOOD PRESSURE: 138 MMHG | OXYGEN SATURATION: 94 % | RESPIRATION RATE: 18 BRPM | DIASTOLIC BLOOD PRESSURE: 98 MMHG

## 2024-04-24 DIAGNOSIS — F90.9 ADULT ADHD: ICD-10-CM

## 2024-04-24 PROCEDURE — 99213 OFFICE O/P EST LOW 20 MIN: CPT | Performed by: FAMILY MEDICINE

## 2024-04-24 NOTE — PROGRESS NOTES
"Chief Complaint  ADHD (3 month f/u)    Subjective    History of Present Illness    Greg Adkins presents to [facility] for ADHD (3 month f/u).  History of Present Illness     Here today for f/u as above. Doing well with methylphenidate, reports good adherence and tolerance, no unwanted side effects. Hasn't noticed any problems with sleep. Has noticed a slight decrease in appetite, which he doesn't mind. PDMP is reviewed and appropriate. No need for refill today.     Objective     Vital Signs:   /98   Pulse 86   Resp 18   Ht 172.7 cm (68\")   Wt 128 kg (282 lb)   SpO2 94%   BMI 42.88 kg/m²   Physical Exam  Vitals and nursing note reviewed.   Constitutional:       General: He is not in acute distress.     Appearance: Normal appearance. He is not ill-appearing.   Cardiovascular:      Rate and Rhythm: Normal rate and regular rhythm.      Pulses: Normal pulses.      Heart sounds: Normal heart sounds. No murmur heard.  Pulmonary:      Effort: Pulmonary effort is normal. No respiratory distress.      Breath sounds: Normal breath sounds. No rales.   Neurological:      Mental Status: He is alert and oriented to person, place, and time. Mental status is at baseline.   Psychiatric:         Mood and Affect: Mood normal.         Behavior: Behavior normal.                        Assessment and Plan   Diagnoses and all orders for this visit:    1. Adult ADHD    Continue med as prescribed. He will contact me as refill is needed.     Recommended follow up as below. Encouraged communication via [employer] in the meantime.     Patient was given instructions and counseling regarding his condition or for health maintenance advice. Please see specific information pulled into the AVS (placed there by myself) if appropriate.    Return in about 4 months (around 8/24/2024) for Preventive Health Maintenance, f/u ADHD.    BEBA Recio MD      "

## 2024-07-02 DIAGNOSIS — F90.9 ADULT ADHD: ICD-10-CM

## 2024-07-02 RX ORDER — METHYLPHENIDATE HYDROCHLORIDE 27 MG/1
27 TABLET ORAL EVERY MORNING
Qty: 90 TABLET | Refills: 0 | Status: SHIPPED | OUTPATIENT
Start: 2024-07-02

## 2024-08-22 ENCOUNTER — OFFICE VISIT (OUTPATIENT)
Dept: FAMILY MEDICINE CLINIC | Facility: CLINIC | Age: 44
End: 2024-08-22
Payer: COMMERCIAL

## 2024-08-22 VITALS
HEART RATE: 52 BPM | SYSTOLIC BLOOD PRESSURE: 130 MMHG | WEIGHT: 278.7 LBS | RESPIRATION RATE: 16 BRPM | OXYGEN SATURATION: 98 % | DIASTOLIC BLOOD PRESSURE: 98 MMHG | BODY MASS INDEX: 42.24 KG/M2 | HEIGHT: 68 IN

## 2024-08-22 DIAGNOSIS — I10 PRIMARY HYPERTENSION: ICD-10-CM

## 2024-08-22 DIAGNOSIS — F90.9 ADULT ADHD: ICD-10-CM

## 2024-08-22 DIAGNOSIS — Z13.1 SCREENING FOR DIABETES MELLITUS: ICD-10-CM

## 2024-08-22 DIAGNOSIS — Z13.6 ENCOUNTER FOR LIPID SCREENING FOR CARDIOVASCULAR DISEASE: ICD-10-CM

## 2024-08-22 DIAGNOSIS — Z23 NEED FOR VACCINATION: ICD-10-CM

## 2024-08-22 DIAGNOSIS — Z00.00 ROUTINE HEALTH MAINTENANCE: Primary | ICD-10-CM

## 2024-08-22 DIAGNOSIS — Z13.220 ENCOUNTER FOR LIPID SCREENING FOR CARDIOVASCULAR DISEASE: ICD-10-CM

## 2024-08-22 LAB
ALBUMIN SERPL-MCNC: 4.6 G/DL (ref 3.5–5.2)
ALBUMIN/GLOB SERPL: 2.2 G/DL
ALP SERPL-CCNC: 80 U/L (ref 39–117)
ALT SERPL-CCNC: 33 U/L (ref 1–41)
AST SERPL-CCNC: 24 U/L (ref 1–40)
BILIRUB SERPL-MCNC: 0.6 MG/DL (ref 0–1.2)
BUN SERPL-MCNC: 9 MG/DL (ref 6–20)
BUN/CREAT SERPL: 8.4 (ref 7–25)
CALCIUM SERPL-MCNC: 9.7 MG/DL (ref 8.6–10.5)
CHLORIDE SERPL-SCNC: 102 MMOL/L (ref 98–107)
CHOLEST SERPL-MCNC: 188 MG/DL (ref 0–200)
CO2 SERPL-SCNC: 29 MMOL/L (ref 22–29)
CREAT SERPL-MCNC: 1.07 MG/DL (ref 0.76–1.27)
EGFRCR SERPLBLD CKD-EPI 2021: 86.7 ML/MIN/1.73
GLOBULIN SER CALC-MCNC: 2.1 GM/DL
GLUCOSE SERPL-MCNC: 89 MG/DL (ref 65–99)
HDLC SERPL-MCNC: 49 MG/DL (ref 40–60)
LDLC SERPL CALC-MCNC: 126 MG/DL (ref 0–100)
POTASSIUM SERPL-SCNC: 4.4 MMOL/L (ref 3.5–5.2)
PROT SERPL-MCNC: 6.7 G/DL (ref 6–8.5)
SODIUM SERPL-SCNC: 139 MMOL/L (ref 136–145)
TRIGL SERPL-MCNC: 72 MG/DL (ref 0–150)
VLDLC SERPL CALC-MCNC: 13 MG/DL (ref 5–40)

## 2024-08-22 PROCEDURE — 99396 PREV VISIT EST AGE 40-64: CPT | Performed by: FAMILY MEDICINE

## 2024-08-22 NOTE — PROGRESS NOTES
"Chief Complaint  Annual Exam    Subjective    History of Present Illness    Greg Adkins presents to [facility] for Annual Exam.  History of Present Illness     Here today for annual exam and to discuss preventive health maintenance.    Doing fairly well overall. Continues on methylphenidate for management of ADHD. Reports good adherence and tolerance, good symptom control and no unwanted side effects. No need for refill at this time. PDMP is reviewed and appropriate.    Blood pressure remains high, we have discussed checking at home but he has not yet done this. Assures me that he will going forward. Denies any signs or symptoms of high blood pressure at this time.    Due for some routine blood work today, is otherwise fairly well caught up on preventive health recommendations. Will be getting vaccines in the fall.    No major changes to diet or exercise. Weight is down about 20 pounds since the beginning of the year.    Has good family and social support. Work going well. Stresses reasonably well-managed.    Objective     Vital Signs:   /98   Pulse 52   Resp 16   Ht 172.7 cm (68\")   Wt 126 kg (278 lb 11.2 oz)   SpO2 98%   BMI 42.38 kg/m²   Physical Exam  Vitals and nursing note reviewed.   Constitutional:       General: He is not in acute distress.     Appearance: Normal appearance. He is not ill-appearing.   Cardiovascular:      Rate and Rhythm: Normal rate and regular rhythm.      Pulses: Normal pulses.      Heart sounds: Normal heart sounds. No murmur heard.  Pulmonary:      Effort: Pulmonary effort is normal. No respiratory distress.      Breath sounds: Normal breath sounds. No rales.   Neurological:      Mental Status: He is alert and oriented to person, place, and time. Mental status is at baseline.   Psychiatric:         Mood and Affect: Mood normal.         Behavior: Behavior normal.                 Assessment and Plan   Diagnoses and all orders for this " visit:    1. Routine health maintenance (Primary)    2. Adult ADHD    3. Primary hypertension  -     Comprehensive Metabolic Panel    4. Screening for diabetes mellitus  -     Comprehensive Metabolic Panel    5. Encounter for lipid screening for cardiovascular disease  -     Lipid Panel    6. Need for vaccination    Orders as above. I will contact him with results as available. Continue regimen as prescribed.    Encouraged to check blood pressures at home a few times a week and get back to me within the month. We can discuss management as needed.    Class 3 Severe Obesity (BMI >=40). Obesity-related health conditions include the following: obstructive sleep apnea and hypertension. Obesity is improving with lifestyle modifications. BMI is is above average; BMI management plan is completed. We discussed portion control and increasing exercise.    Encouraged to continue working on healthy lifestyle habits. Recommended follow up as below. Encouraged communication via [employer] in the meantime.     Patient was given instructions and counseling regarding his condition or for health maintenance advice. Please see specific information pulled into the AVS (placed there by myself) if appropriate.    Return in about 6 months (around 2/22/2025), or if symptoms worsen or fail to improve, for f/u ADHD.    BEBA Recio MD    Prevention counseling performed as below: Mindfulness for stress management.

## 2024-08-22 NOTE — PATIENT INSTRUCTIONS
Mindfulness apps: Headspace, Smiling Mind, [place]!    Mindfulness-Based Stress Reduction  Mindfulness-based stress reduction (MBSR) is a program that helps people learn to practice mindfulness. Mindfulness is the practice of intentionally paying attention to the present moment. It can be learned and practiced through techniques such as education, breathing exercises, meditation, and yoga. MBSR includes several mindfulness techniques in one program.  MBSR works best when you understand the treatment, are willing to try new things, and can commit to spending time practicing what you learn. MBSR training may include learning about:  How your emotions, thoughts, and reactions affect your body.  New ways to respond to things that cause negative thoughts to start (triggers).  How to notice your thoughts and let go of them.  Practicing awareness of everyday things that you normally do without thinking.  The techniques and goals of different types of meditation.  What are the benefits of MBSR?  MBSR can have many benefits, which include helping you to:  Develop self-awareness. This refers to knowing and understanding yourself.  Learn skills and attitudes that help you to participate in your own health care.  Learn new ways to care for yourself.  Be more accepting about how things are, and let things go.  Be less judgmental and approach things with an open mind.  Be patient with yourself and trust yourself more.  MBSR has also been shown to:  Reduce negative emotions, such as depression and anxiety.  Improve memory and focus.  Change how you sense and approach pain.  Boost your body's ability to fight infections.  Help you connect better with other people.  Improve your sense of well-being.  Follow these instructions at home:    Find a local in-person or online MBSR program.  Set aside some time regularly for mindfulness practice.  Find a mindfulness practice that works best for you. This may include one or more of the  following:  Meditation. Meditation involves focusing your mind on a certain thought or activity.  Breathing awareness exercises. These help you to stay present by focusing on your breath.  Body scan. For this practice, you lie down and pay attention to each part of your body from head to toe. You can identify tension and soreness and intentionally relax parts of your body.  Yoga. Yoga involves stretching and breathing, and it can improve your ability to move and be flexible. It can also provide an experience of testing your body's limits, which can help you release stress.  Mindful eating. This way of eating involves focusing on the taste, texture, color, and smell of each bite of food. Because this slows down eating and helps you feel full sooner, it can be an important part of a weight-loss plan.  Find a podcast or recording that provides guidance for breathing awareness, body scan, or meditation exercises. You can listen to these any time when you have a free moment to rest without distractions.  Follow your treatment plan as told by your health care provider. This may include taking regular medicines and making changes to your diet or lifestyle as recommended.  How to practice mindfulness  To do a basic awareness exercise:  Find a comfortable place to sit.  Pay attention to the present moment. Observe your thoughts, feelings, and surroundings just as they are.  Avoid placing judgment on yourself, your feelings, or your surroundings. Make note of any judgment that comes up, and let it go.  Your mind may wander, and that is okay. Make note of when your thoughts drift, and return your attention to the present moment.  To do basic mindfulness meditation:  Find a comfortable place to sit. This may include a stable chair or a firm floor cushion.  Sit upright with your back straight. Let your arms fall next to your side with your hands resting on your legs.  If sitting in a chair, rest your feet flat on the floor.  If  sitting on a cushion, cross your legs in front of you.  Keep your head in a neutral position with your chin dropped slightly. Relax your jaw and rest the tip of your tongue on the roof of your mouth. Drop your gaze to the floor. You can close your eyes if you like.  Breathe normally and pay attention to your breath. Feel the air moving in and out of your nose. Feel your belly expanding and relaxing with each breath.  Your mind may wander, and that is okay. Make note of when your thoughts drift, and return your attention to your breath.  Avoid placing judgment on yourself, your feelings, or your surroundings. Make note of any judgment or feelings that come up, let them go, and bring your attention back to your breath.  When you are ready, lift your gaze or open your eyes. Pay attention to how your body feels after the meditation.  Where to find more information  You can find more information about MBSR from:  Your health care provider.  Community-based meditation centers or programs.  Programs offered near you.  Summary  Mindfulness-based stress reduction (MBSR) is a program that teaches you how to intentionally pay attention to the present moment. It is used with other treatments to help you cope better with daily stress, emotions, and pain.  MBSR focuses on developing self-awareness, which allows you to respond to life stress without judgment or negative emotions.  MBSR programs may involve learning different mindfulness practices, such as breathing exercises, meditation, yoga, body scan, or mindful eating. Find a mindfulness practice that works best for you, and set aside time for it on a regular basis.  This information is not intended to replace advice given to you by your health care provider. Make sure you discuss any questions you have with your health care provider.  Document Released: 04/26/2018 Document Revised: 11/30/2018 Document Reviewed: 04/26/2018  Elsevier Patient Education © 2020 [employer] Inc[employer]

## 2024-10-11 DIAGNOSIS — F90.9 ADULT ADHD: ICD-10-CM

## 2024-10-11 RX ORDER — METHYLPHENIDATE HYDROCHLORIDE 27 MG/1
27 TABLET ORAL EVERY MORNING
Qty: 30 TABLET | Refills: 0 | Status: SHIPPED | OUTPATIENT
Start: 2024-10-11

## 2024-10-11 NOTE — TELEPHONE ENCOUNTER
LAST REFILL - 07/02/24  LAST VISIT - 08/22/24  NEXT VISIT - 02/20/25    Routing to Dr Villalpando per PCP being out of office, controlled substance.

## 2024-11-01 ENCOUNTER — HOSPITAL ENCOUNTER (EMERGENCY)
Facility: HOSPITAL | Age: 44
Discharge: HOME | End: 2024-11-01
Attending: EMERGENCY MEDICINE
Payer: COMMERCIAL

## 2024-11-01 VITALS
HEART RATE: 99 BPM | RESPIRATION RATE: 18 BRPM | DIASTOLIC BLOOD PRESSURE: 73 MMHG | HEIGHT: 68 IN | WEIGHT: 295 LBS | BODY MASS INDEX: 44.71 KG/M2 | OXYGEN SATURATION: 98 % | TEMPERATURE: 97.9 F | SYSTOLIC BLOOD PRESSURE: 141 MMHG

## 2024-11-01 DIAGNOSIS — S91.115A: Primary | ICD-10-CM

## 2024-11-01 LAB — GLUCOSE BLDC GLUCOMTR-MCNC: 132 MG/DL (ref 70–130)

## 2024-11-01 PROCEDURE — 82948 REAGENT STRIP/BLOOD GLUCOSE: CPT

## 2024-11-01 PROCEDURE — 90471 IMMUNIZATION ADMIN: CPT | Performed by: PHYSICIAN ASSISTANT

## 2024-11-01 PROCEDURE — 25010000002 TETANUS-DIPHTH-ACELL PERTUSSIS 5-2.5-18.5 LF-MCG/0.5 SUSPENSION PREFILLED SYRINGE: Performed by: PHYSICIAN ASSISTANT

## 2024-11-01 PROCEDURE — 99282 EMERGENCY DEPT VISIT SF MDM: CPT

## 2024-11-01 PROCEDURE — 90715 TDAP VACCINE 7 YRS/> IM: CPT | Performed by: PHYSICIAN ASSISTANT

## 2024-11-01 PROCEDURE — 25010000002 LIDOCAINE 1% - EPINEPHRINE 1:100000 1 %-1:100000 SOLUTION: Performed by: PHYSICIAN ASSISTANT

## 2024-11-01 RX ORDER — LIDOCAINE HYDROCHLORIDE AND EPINEPHRINE 10; 10 MG/ML; UG/ML
10 INJECTION, SOLUTION INFILTRATION; PERINEURAL ONCE
Status: COMPLETED | OUTPATIENT
Start: 2024-11-01 | End: 2024-11-01

## 2024-11-01 RX ADMIN — TETANUS TOXOID, REDUCED DIPHTHERIA TOXOID AND ACELLULAR PERTUSSIS VACCINE, ADSORBED 0.5 ML: 5; 2.5; 8; 8; 2.5 SUSPENSION INTRAMUSCULAR at 22:36

## 2024-11-01 RX ADMIN — LIDOCAINE HYDROCHLORIDE AND EPINEPHRINE 10 ML: 10; 10 INJECTION, SOLUTION INFILTRATION; PERINEURAL at 22:37

## 2024-11-01 NOTE — Clinical Note
[facility] EMERGENCY DEPARTMENT  [address]  [facility] [address]  Phone: [phone]    Greg Adkins was seen and treated in our emergency department on 11/1/2024.  He may return to work on 11/02/2024.  Patient okay to walk.  Should not do any explosive movements off the feet.  No squatting or lifting greater than 15 pounds for 1 week.       Thank you for choosing [facility].    Delmy Horne PA-C

## 2024-11-02 NOTE — DISCHARGE INSTRUCTIONS
Follow-up with PCP for wound recheck and suture removal.  Have the stitches removed in 7 to 10 days.  You may shower and wash your foot with soap and gentle cleanser.  Do not perform any explosive movements off the foot until stitches are removed.  Take Tylenol as needed for pain.  Return to emergency department for any worsening symptoms.

## 2024-11-02 NOTE — ED PROVIDER NOTES
MD ATTESTATION NOTE  I supervised care provided by the midlevel provider. We have discussed this patient's history, physical exam, and treatment plan. I have reviewed the midlevel provider's note and I agree with the midlevel provider's findings and plan of care.   SHARED VISIT: This visit was performed by BOTH a physician and an APC. The substantive portion of the medical decision making was performed by this attesting physician who made or approved the management plan and takes responsibility for patient management. All studies in the APC note (if performed) were independently interpreted by me.   I have personally had a face to face encounter with the patient.     PCP: Nathen Recio MD  Patient Care Team:  Nathen Recio MD as PCP - General (Family Medicine)  Nathen Mcmillan MD as Surgeon (Hand Surgery)     Greg Adkins is a 44 y.o. male who presents to the ED c/o laceration to left foot.  Patient reports he was at baseline health, dropped a knife sustaining a laceration to his left middle toe.  Patient reports was having bleeding, bleeding now controlled.  No weakness or numbness.  Patient is not on a blood thinner.  Tetanus not up-to-date.    On exam:  General: NAD.  Head: NCAT.  ENT: nares patent, no scleral icterus  Neck: Supple, trachea midline.  Cardiac: regular rate and rhythm.  Lungs: normal effort, clear to auscultation bilaterally  Abdomen: Soft, nondistended, NTTP, no rebound tenderness, no guarding or rigidity.   Extremities: Moves all extremities well, no peripheral edema  Neuro: alert, MAEW, follows commands  Psych: calm, cooperative  Skin: Warm, dry.  Laceration on the dorsum of the left middle toe.      Medical Decision Making:  After the initial H&P, I discussed pertinent information from history and physical exam with patient/family.  Discussed differential diagnosis.  Discussed plan for ED evaluation/work-up/treatment.  All questions answered.  Patient/family is  agreeable with plan.    ED Course as of 11/02/24 0741   Fri Nov 01, 2024   2307 Patient presents to emergency department with laceration to left middle toe.  This was anesthetized and repaired as described above.  No visible tendon injury.  He is able to plantarflex and dorsiflex against resistance.  Discussed suture removal and wound care instructions with the patient.  Encouraged him to follow-up with PCP and discussed ED return precautions.  He is otherwise well-appearing, hemodynamically stable, and therefore appropriate for discharge. [MP]      ED Course User Index  [MP] Delmy Horne PA-C       Diagnosis  Final diagnoses:   Laceration of middle toe, left, initial encounter          Emeterio Lara MD  11/01/24 1154       Emeterio Lara MD  11/02/24 0711

## 2024-11-02 NOTE — ED TRIAGE NOTES
Pt arrived to ED ambulatory w/ crutches. Pt reports dropping a knife on his foot at home. Bleeding is controlled and pt has a bandage on the toes right now. Pt denies any blood thinners. Pt Aox4 at time of triage.

## 2024-11-02 NOTE — ED PROVIDER NOTES
EMERGENCY DEPARTMENT ENCOUNTER  Room Number:  HA2/B  PCP: Nathen Recio MD  Independent Historians: Patient      HPI:  Chief Complaint: had concerns including Laceration.     A complete HPI/ROS/PMH/PSH/SH/FH are unobtainable due to: None    Chronic or social conditions impacting patient care (Social Determinants of Health): None      Context: Greg dAkins is a 44 y.o. male with a medical history of ADHD, kidney stones, obesity, hypertension, and hypothyroidism who presents to the ED c/o acute laceration to left middle toe.  Patient reports prior to arrival he was cutting up pork chops.  The knife slipped and landed on his left middle toe.  Wife was able to wrap the toe and stop the bleeding.  He is not anticoagulated.  Last Tdap 2016.  Patient denies numbness or tingling of the left middle toe.  No other systemic complaints at this time.      Review of prior external notes (non-ED) -and- Review of prior external test results outside of this encounter:  Patient seen in office by PCP on 8/22/2024 for routine health maintenance.  Reviewed assessment and plan.  Will check screening labs and have patient follow-up in 6 months.  Reviewed labs collected on 8/22/2024.  CMP with creatinine 1.07, lipid panel with total cholesterol 188.    Prescription drug monitoring program review:     N/A    PAST MEDICAL HISTORY  Active Ambulatory Problems     Diagnosis Date Noted    Kidney stones     Class 3 severe obesity due to excess calories without serious comorbidity with body mass index (BMI) of 45.0 to 49.9 in adult 08/14/2023    Adult ADHD 01/24/2024    Mild episode of recurrent major depressive disorder 01/24/2024    Primary hypertension 01/24/2024     Resolved Ambulatory Problems     Diagnosis Date Noted    No Resolved Ambulatory Problems     Past Medical History:   Diagnosis Date    ADHD (attention deficit hyperactivity disorder)     Anxiety     Hyperlipidemia     Hypothyroidism          PAST SURGICAL  HISTORY  Past Surgical History:   Procedure Laterality Date    TONSILLECTOMY  1978    6 months olq    VASECTOMY  2005         FAMILY HISTORY  Family History   Problem Relation Age of Onset    No Known Problems Mother     No Known Problems Father     Scoliosis Brother     Diabetes type I Brother     Diabetes Brother     No Known Problems Daughter     No Known Problems Son     Cancer Maternal Grandmother     Arthritis Paternal Grandfather     Vision loss Paternal Grandmother     Alcohol abuse Paternal Aunt          SOCIAL HISTORY  Social History     Socioeconomic History    Marital status:    Tobacco Use    Smoking status: Former     Types: Pipe, Cigars     Quit date: 2015     Years since quittin.8    Smokeless tobacco: Never    Tobacco comments:     Occasional pipe   Vaping Use    Vaping status: Never Used   Substance and Sexual Activity    Alcohol use: Not Currently     Comment: very rare, maybe once a year     Drug use: No    Sexual activity: Yes     Partners: Female     Birth control/protection: None, Vasectomy         ALLERGIES  Patient has no known allergies.      REVIEW OF SYSTEMS  Included in HPI  All systems reviewed and negative except for those discussed in HPI.      PHYSICAL EXAM    I have reviewed the triage vital signs and nursing notes.    ED Triage Vitals   Temp Heart Rate Resp BP SpO2   24   97.9 °F (36.6 °C) 99 18 141/73 98 %      Temp src Heart Rate Source Patient Position BP Location FiO2 (%)   24 -- 24 --   Oral  Sitting Left arm        Physical Exam  Constitutional:       General: He is not in acute distress.     Appearance: Normal appearance.   HENT:      Head: Normocephalic and atraumatic.      Nose: Nose normal.      Mouth/Throat:      Mouth: Mucous membranes are moist.   Eyes:      Conjunctiva/sclera: Conjunctivae normal.      Pupils: Pupils are equal, round, and reactive to  light.   Cardiovascular:      Rate and Rhythm: Normal rate and regular rhythm.      Pulses: Normal pulses.      Heart sounds: Normal heart sounds.   Pulmonary:      Effort: Pulmonary effort is normal.      Breath sounds: Normal breath sounds.   Abdominal:      General: There is no distension.   Musculoskeletal:         General: Normal range of motion.      Cervical back: Normal range of motion and neck supple.        Feet:    Feet:      Comments: Approximately 3 cm laceration over the dorsal aspect of the left middle toe.  Skin:     General: Skin is warm.      Capillary Refill: Capillary refill takes less than 2 seconds.   Neurological:      General: No focal deficit present.      Mental Status: He is alert and oriented to person, place, and time.   Psychiatric:         Mood and Affect: Mood normal.             LAB RESULTS  Recent Results (from the past 24 hours)   POC Glucose Once    Collection Time: 11/01/24  9:02 PM    Specimen: Blood   Result Value Ref Range    Glucose 132 (H) 70 - 130 mg/dL           MEDICATIONS GIVEN IN ER  Medications   lidocaine 1% - EPINEPHrine 1:016705 (XYLOCAINE W/EPI) 1 %-1:220362 injection 10 mL (10 mL Injection Given by Other 11/1/24 2237)   Tetanus-Diphth-Acell Pertussis (BOOSTRIX) injection 0.5 mL (0.5 mL Intramuscular Given 11/1/24 2236)         ORDERS PLACED DURING THIS VISIT:  Orders Placed This Encounter   Procedures    Laceration Repair    POC Glucose Once         OUTPATIENT MEDICATION MANAGEMENT:  No current Epic-ordered facility-administered medications on file.     Current Outpatient Medications Ordered in Epic   Medication Sig Dispense Refill    methylphenidate (Concerta) 27 MG CR tablet Take 1 tablet by mouth Every Morning 30 tablet 0         PROCEDURES  Laceration Repair    Date/Time: 11/1/2024 11:03 PM    Performed by: Delmy Horne PA-C  Authorized by: Emeterio Lara MD    Consent:     Consent obtained:  Verbal    Consent given by:  Patient    Risks, benefits,  and alternatives were discussed: yes      Risks discussed:  Pain, poor cosmetic result, infection and need for additional repair    Alternatives discussed:  No treatment  Universal protocol:     Procedure explained and questions answered to patient or proxy's satisfaction: yes      Patient identity confirmed:  Verbally with patient  Anesthesia:     Anesthesia method:  Local infiltration    Local anesthetic:  Lidocaine 1% WITH epi  Laceration details:     Location:  Toe    Toe location:  L third toe    Length (cm):  2.5  Pre-procedure details:     Preparation:  Patient was prepped and draped in usual sterile fashion  Exploration:     Limited defect created (wound extended): no      Hemostasis achieved with:  Epinephrine and direct pressure    Imaging outcome: foreign body not noted      Wound exploration: wound explored through full range of motion and entire depth of wound visualized      Wound extent: no foreign bodies/material noted and no tendon damage noted      Contaminated: no    Treatment:     Area cleansed with:  Saline    Amount of cleaning:  Standard    Irrigation solution:  Sterile saline    Irrigation volume:  30 ml    Irrigation method:  Syringe    Visualized foreign bodies/material removed: no      Debridement:  Minimal    Undermining:  None    Scar revision: no    Skin repair:     Repair method:  Sutures    Suture size:  5-0    Suture material:  Nylon    Suture technique:  Simple interrupted    Number of sutures:  5  Approximation:     Approximation:  Close  Repair type:     Repair type:  Simple  Post-procedure details:     Dressing:  Non-adherent dressing    Procedure completion:  Tolerated well, no immediate complications          PROGRESS, DATA ANALYSIS, CONSULTS, AND MEDICAL DECISION MAKING  All labs have been independently interpreted by me.  All radiology studies have been reviewed by me. All EKG's have been independently viewed and interpreted by me.  Discussion below represents my analysis of  pertinent findings related to patient's condition, differential diagnosis, treatment plan and final disposition.    Differential diagnosis includes but is not limited to toe laceration, tendon laceration, phalanx fracture.        ED Course as of 11/01/24 2358   Fri Nov 01, 2024   8617 Patient presents to emergency department with laceration to left middle toe.  This was anesthetized and repaired as described above.  No visible tendon injury.  He is able to plantarflex and dorsiflex against resistance.  Discussed suture removal and wound care instructions with the patient.  Encouraged him to follow-up with PCP and discussed ED return precautions.  He is otherwise well-appearing, hemodynamically stable, and therefore appropriate for discharge. [MP]      ED Course User Index  [MP] Delmy Horne PA-C             AS OF 23:58 EDT VITALS:    BP - 141/73  HR - 99  TEMP - 97.9 °F (36.6 °C) (Oral)  O2 SATS - 98%    COMPLEXITY OF CARE  Admission was considered but after careful review of the patient's presentation, physical examination, diagnostic results, and response to treatment the patient may be safely discharged with outpatient follow-up.      DIAGNOSIS  Final diagnoses:   Laceration of middle toe, left, initial encounter         DISPOSITION  ED Disposition       ED Disposition   Discharge    Condition   Stable    Comment   --                Please note that portions of this document were completed with a voice recognition program.    Note Disclaimer: At [facility], we believe that sharing information builds trust and better relationships. You are receiving this note because you recently visited [facility]. It is possible you will see health information before a provider has talked with you about it. This kind of information can be easy to misunderstand. To help you fully understand what it means for your health, we urge you to discuss this note with your provider.     Delmy Horne PA-C  11/01/24  [id number]

## 2024-11-07 RX ORDER — CEPHALEXIN 500 MG/1
1000 CAPSULE ORAL 2 TIMES DAILY
Qty: 20 CAPSULE | Refills: 0 | Status: SHIPPED | OUTPATIENT
Start: 2024-11-07 | End: 2024-11-12

## 2024-11-12 ENCOUNTER — OFFICE VISIT (OUTPATIENT)
Dept: FAMILY MEDICINE CLINIC | Facility: CLINIC | Age: 44
End: 2024-11-12
Payer: COMMERCIAL

## 2024-11-12 VITALS
OXYGEN SATURATION: 98 % | WEIGHT: 292.7 LBS | RESPIRATION RATE: 16 BRPM | HEIGHT: 68 IN | DIASTOLIC BLOOD PRESSURE: 92 MMHG | SYSTOLIC BLOOD PRESSURE: 130 MMHG | BODY MASS INDEX: 44.36 KG/M2 | HEART RATE: 102 BPM

## 2024-11-12 DIAGNOSIS — S91.115D LACERATION OF LESSER TOE OF LEFT FOOT WITHOUT FOREIGN BODY PRESENT OR DAMAGE TO NAIL, SUBSEQUENT ENCOUNTER: ICD-10-CM

## 2024-11-12 DIAGNOSIS — F90.9 ADULT ADHD: Primary | ICD-10-CM

## 2024-11-12 PROCEDURE — 15853 REMOVAL SUTR/STAPL XREQ ANES: CPT | Performed by: FAMILY MEDICINE

## 2024-11-12 PROCEDURE — 99213 OFFICE O/P EST LOW 20 MIN: CPT | Performed by: FAMILY MEDICINE

## 2024-11-12 RX ORDER — METHYLPHENIDATE HYDROCHLORIDE 27 MG/1
27 TABLET ORAL EVERY MORNING
Qty: 90 TABLET | Refills: 0 | Status: SHIPPED | OUTPATIENT
Start: 2024-11-12

## 2024-11-12 NOTE — PROGRESS NOTES
"Chief Complaint  Toe Pain (Swollen red third toe on left food where stitches are. No heat, denies pain.), Nail Problem (Toenail fungus x a few years on most toenails jam feet), and Med Refill (Patient requesting 90 day refill of methylphenidate)    Subjective    History of Present Illness    Greg Adkins presents to [facility] for Toe Pain (Swollen red third toe on left food where stitches are. No heat, denies pain.), Nail Problem (Toenail fungus x a few years on most toenails jam feet), and Med Refill (Patient requesting 90 day refill of methylphenidate).  History of Present Illness     Here today for follow-up as above.    Had a laceration to his left third toe after dropping a knife. Was seen at urgent care and had 5 sutures placed. Was treated subsequently with some antibiotics for some cellulitis. Seems to be doing better now. Needs the sutures removed.    Needs a refill of his methylphenidate today. Reports good adherence and tolerance, no unwanted side effects. PDMP is reviewed and appropriate. Symptoms are well-controlled.    Objective     Vital Signs:   /92   Pulse 102   Resp 16   Ht 172.7 cm (68\")   Wt 133 kg (292 lb 11.2 oz)   SpO2 98%   BMI 44.50 kg/m²   Physical Exam  Vitals and nursing note reviewed.   Constitutional:       General: He is not in acute distress.     Appearance: Normal appearance. He is not ill-appearing.   Cardiovascular:      Rate and Rhythm: Normal rate and regular rhythm.      Pulses: Normal pulses.      Heart sounds: Normal heart sounds. No murmur heard.  Pulmonary:      Effort: Pulmonary effort is normal. No respiratory distress.      Breath sounds: Normal breath sounds. No rales.   Musculoskeletal:        Feet:    Feet:      Comments: 5 simple interrupted sutures removed without difficulty from the left third toe. There is some minor erythema but no signs of active infection.  Neurological:      Mental Status: He is alert and oriented " to person, place, and time. Mental status is at baseline.   Psychiatric:         Mood and Affect: Mood normal.         Behavior: Behavior normal.                 Assessment and Plan   Diagnoses and all orders for this visit:    1. Adult ADHD (Primary)  -     methylphenidate (Concerta) 27 MG CR tablet; Take 1 tablet by mouth Every Morning  Dispense: 90 tablet; Refill: 0    2. Laceration of lesser toe of left foot without foreign body present or damage to nail, subsequent encounter    Refill as requested. Sutures removed as requested. Will continue with standard wound dressings. Anticipate resolution with time.    Recommended follow up as below. Encouraged communication via MyChart in the meantime.     Patient was given instructions and counseling regarding his condition or for health maintenance advice. Please see specific information pulled into the AVS (placed there by myself) if appropriate.    Return in about 3 months (around 2/12/2025) for f/u ADHD.    BEBA Recio MD

## 2024-11-22 DIAGNOSIS — F90.9 ADULT ADHD: ICD-10-CM

## 2024-11-22 RX ORDER — METHYLPHENIDATE HYDROCHLORIDE 27 MG/1
27 TABLET ORAL EVERY MORNING
Qty: 90 TABLET | Refills: 0 | OUTPATIENT
Start: 2024-11-22

## 2024-12-09 RX ORDER — CEPHALEXIN 500 MG/1
1000 CAPSULE ORAL 2 TIMES DAILY
Qty: 20 CAPSULE | Refills: 0 | Status: SHIPPED | OUTPATIENT
Start: 2024-12-09 | End: 2024-12-14

## 2024-12-30 ENCOUNTER — APPOINTMENT (OUTPATIENT)
Dept: MRI IMAGING | Facility: HOSPITAL | Age: 44
End: 2024-12-30
Payer: COMMERCIAL

## 2024-12-30 ENCOUNTER — HOSPITAL ENCOUNTER (OUTPATIENT)
Facility: HOSPITAL | Age: 44
Setting detail: OBSERVATION
Discharge: HOME | End: 2024-12-31
Attending: EMERGENCY MEDICINE | Admitting: EMERGENCY MEDICINE
Payer: COMMERCIAL

## 2024-12-30 DIAGNOSIS — S13.4XXA SPRAIN OF LIGAMENT OF CERVICAL SPINE REGION: ICD-10-CM

## 2024-12-30 DIAGNOSIS — R29.898 LEFT HAND WEAKNESS: ICD-10-CM

## 2024-12-30 DIAGNOSIS — M54.2 NECK PAIN: ICD-10-CM

## 2024-12-30 DIAGNOSIS — M54.12 CERVICAL RADICULOPATHY: Primary | ICD-10-CM

## 2024-12-30 LAB
ALBUMIN SERPL-MCNC: 4.2 G/DL (ref 3.5–5.2)
ALBUMIN/GLOB SERPL: 1.6 G/DL
ALP SERPL-CCNC: 78 U/L (ref 39–117)
ALT SERPL W P-5'-P-CCNC: 33 U/L (ref 1–41)
ANION GAP SERPL CALCULATED.3IONS-SCNC: 8.8 MMOL/L (ref 5–15)
AST SERPL-CCNC: 26 U/L (ref 1–40)
BASOPHILS # BLD AUTO: 0.04 10*3/MM3 (ref 0–0.2)
BASOPHILS NFR BLD AUTO: 0.4 % (ref 0–1.5)
BILIRUB SERPL-MCNC: 0.4 MG/DL (ref 0–1.2)
BUN SERPL-MCNC: 16 MG/DL (ref 6–20)
BUN/CREAT SERPL: 13.7 (ref 7–25)
CALCIUM SPEC-SCNC: 9 MG/DL (ref 8.6–10.5)
CHLORIDE SERPL-SCNC: 102 MMOL/L (ref 98–107)
CO2 SERPL-SCNC: 28.2 MMOL/L (ref 22–29)
CREAT SERPL-MCNC: 1.17 MG/DL (ref 0.76–1.27)
DEPRECATED RDW RBC AUTO: 43.3 FL (ref 37–54)
EGFRCR SERPLBLD CKD-EPI 2021: 77.9 ML/MIN/1.73
EOSINOPHIL # BLD AUTO: 0.13 10*3/MM3 (ref 0–0.4)
EOSINOPHIL NFR BLD AUTO: 1.4 % (ref 0.3–6.2)
ERYTHROCYTE [DISTWIDTH] IN BLOOD BY AUTOMATED COUNT: 13.3 % (ref 12.3–15.4)
GLOBULIN UR ELPH-MCNC: 2.7 GM/DL
GLUCOSE SERPL-MCNC: 106 MG/DL (ref 65–99)
HCT VFR BLD AUTO: 49.4 % (ref 37.5–51)
HGB BLD-MCNC: 17.2 G/DL (ref 13–17.7)
IMM GRANULOCYTES # BLD AUTO: 0.1 10*3/MM3 (ref 0–0.05)
IMM GRANULOCYTES NFR BLD AUTO: 1.1 % (ref 0–0.5)
LYMPHOCYTES # BLD AUTO: 2.17 10*3/MM3 (ref 0.7–3.1)
LYMPHOCYTES NFR BLD AUTO: 23.8 % (ref 19.6–45.3)
MCH RBC QN AUTO: 31.1 PG (ref 26.6–33)
MCHC RBC AUTO-ENTMCNC: 34.8 G/DL (ref 31.5–35.7)
MCV RBC AUTO: 89.3 FL (ref 79–97)
MONOCYTES # BLD AUTO: 0.74 10*3/MM3 (ref 0.1–0.9)
MONOCYTES NFR BLD AUTO: 8.1 % (ref 5–12)
NEUTROPHILS NFR BLD AUTO: 5.95 10*3/MM3 (ref 1.7–7)
NEUTROPHILS NFR BLD AUTO: 65.2 % (ref 42.7–76)
NRBC BLD AUTO-RTO: 0 /100 WBC (ref 0–0.2)
PLATELET # BLD AUTO: 204 10*3/MM3 (ref 140–450)
PMV BLD AUTO: 8.9 FL (ref 6–12)
POTASSIUM SERPL-SCNC: 3.9 MMOL/L (ref 3.5–5.2)
PROT SERPL-MCNC: 6.9 G/DL (ref 6–8.5)
RBC # BLD AUTO: 5.53 10*6/MM3 (ref 4.14–5.8)
SODIUM SERPL-SCNC: 139 MMOL/L (ref 136–145)
WBC NRBC COR # BLD AUTO: 9.13 10*3/MM3 (ref 3.4–10.8)

## 2024-12-30 PROCEDURE — 96374 THER/PROPH/DIAG INJ IV PUSH: CPT

## 2024-12-30 PROCEDURE — 72146 MRI CHEST SPINE W/O DYE: CPT

## 2024-12-30 PROCEDURE — G0378 HOSPITAL OBSERVATION PER HR: HCPCS

## 2024-12-30 PROCEDURE — 25010000002 METHYLPREDNISOLONE PER 125 MG: Performed by: PHYSICIAN ASSISTANT

## 2024-12-30 PROCEDURE — 25010000002 DEXAMETHASONE PER 1 MG: Performed by: EMERGENCY MEDICINE

## 2024-12-30 PROCEDURE — 99285 EMERGENCY DEPT VISIT HI MDM: CPT

## 2024-12-30 PROCEDURE — 72141 MRI NECK SPINE W/O DYE: CPT

## 2024-12-30 PROCEDURE — 85025 COMPLETE CBC W/AUTO DIFF WBC: CPT | Performed by: PHYSICIAN ASSISTANT

## 2024-12-30 PROCEDURE — 80053 COMPREHEN METABOLIC PANEL: CPT | Performed by: PHYSICIAN ASSISTANT

## 2024-12-30 PROCEDURE — 25010000002 HYDROMORPHONE PER 4 MG: Performed by: EMERGENCY MEDICINE

## 2024-12-30 RX ORDER — SODIUM CHLORIDE 0.9 % (FLUSH) 0.9 %
10 SYRINGE (ML) INJECTION AS NEEDED
Status: DISCONTINUED | OUTPATIENT
Start: 2024-12-30 | End: 2024-12-31 | Stop reason: HOSPADM

## 2024-12-30 RX ORDER — LIDOCAINE 4 G/G
1 PATCH TOPICAL
Status: DISCONTINUED | OUTPATIENT
Start: 2024-12-30 | End: 2024-12-31 | Stop reason: HOSPADM

## 2024-12-30 RX ORDER — SODIUM CHLORIDE 9 MG/ML
40 INJECTION, SOLUTION INTRAVENOUS AS NEEDED
Status: DISCONTINUED | OUTPATIENT
Start: 2024-12-30 | End: 2024-12-31 | Stop reason: HOSPADM

## 2024-12-30 RX ORDER — HYDROMORPHONE HYDROCHLORIDE 1 MG/ML
0.5 INJECTION, SOLUTION INTRAMUSCULAR; INTRAVENOUS; SUBCUTANEOUS
Status: DISCONTINUED | OUTPATIENT
Start: 2024-12-30 | End: 2024-12-31 | Stop reason: HOSPADM

## 2024-12-30 RX ORDER — HYDROCODONE BITARTRATE AND ACETAMINOPHEN 7.5; 325 MG/1; MG/1
1 TABLET ORAL ONCE
Status: COMPLETED | OUTPATIENT
Start: 2024-12-30 | End: 2024-12-30

## 2024-12-30 RX ORDER — ACETAMINOPHEN 160 MG/5ML
650 SOLUTION ORAL EVERY 4 HOURS PRN
Status: DISCONTINUED | OUTPATIENT
Start: 2024-12-30 | End: 2024-12-31 | Stop reason: HOSPADM

## 2024-12-30 RX ORDER — POLYETHYLENE GLYCOL 3350 17 G/17G
17 POWDER, FOR SOLUTION ORAL DAILY PRN
Status: DISCONTINUED | OUTPATIENT
Start: 2024-12-30 | End: 2024-12-31 | Stop reason: HOSPADM

## 2024-12-30 RX ORDER — BISACODYL 10 MG
10 SUPPOSITORY, RECTAL RECTAL DAILY PRN
Status: DISCONTINUED | OUTPATIENT
Start: 2024-12-30 | End: 2024-12-31 | Stop reason: HOSPADM

## 2024-12-30 RX ORDER — METHOCARBAMOL 500 MG/1
500 TABLET, FILM COATED ORAL EVERY 8 HOURS PRN
Status: DISCONTINUED | OUTPATIENT
Start: 2024-12-30 | End: 2024-12-31 | Stop reason: HOSPADM

## 2024-12-30 RX ORDER — ACETAMINOPHEN 650 MG/1
650 SUPPOSITORY RECTAL EVERY 4 HOURS PRN
Status: DISCONTINUED | OUTPATIENT
Start: 2024-12-30 | End: 2024-12-31 | Stop reason: HOSPADM

## 2024-12-30 RX ORDER — HYDROCODONE BITARTRATE AND ACETAMINOPHEN 5; 325 MG/1; MG/1
1 TABLET ORAL EVERY 4 HOURS PRN
Status: DISCONTINUED | OUTPATIENT
Start: 2024-12-30 | End: 2024-12-30

## 2024-12-30 RX ORDER — DEXAMETHASONE SODIUM PHOSPHATE 4 MG/ML
4 INJECTION, SOLUTION INTRA-ARTICULAR; INTRALESIONAL; INTRAMUSCULAR; INTRAVENOUS; SOFT TISSUE EVERY 6 HOURS
Status: DISCONTINUED | OUTPATIENT
Start: 2024-12-30 | End: 2024-12-31 | Stop reason: HOSPADM

## 2024-12-30 RX ORDER — AMLODIPINE BESYLATE 5 MG/1
5 TABLET ORAL
Status: DISCONTINUED | OUTPATIENT
Start: 2024-12-30 | End: 2024-12-31 | Stop reason: HOSPADM

## 2024-12-30 RX ORDER — SODIUM CHLORIDE 0.9 % (FLUSH) 0.9 %
10 SYRINGE (ML) INJECTION EVERY 12 HOURS SCHEDULED
Status: DISCONTINUED | OUTPATIENT
Start: 2024-12-30 | End: 2024-12-31 | Stop reason: HOSPADM

## 2024-12-30 RX ORDER — HYDROCODONE BITARTRATE AND ACETAMINOPHEN 5; 325 MG/1; MG/1
1 TABLET ORAL EVERY 6 HOURS PRN
Status: DISCONTINUED | OUTPATIENT
Start: 2024-12-30 | End: 2024-12-31 | Stop reason: HOSPADM

## 2024-12-30 RX ORDER — ACETAMINOPHEN 325 MG/1
650 TABLET ORAL EVERY 4 HOURS PRN
Status: DISCONTINUED | OUTPATIENT
Start: 2024-12-30 | End: 2024-12-31 | Stop reason: HOSPADM

## 2024-12-30 RX ORDER — METHYLPREDNISOLONE SODIUM SUCCINATE 125 MG/2ML
80 INJECTION, POWDER, LYOPHILIZED, FOR SOLUTION INTRAMUSCULAR; INTRAVENOUS ONCE
Status: COMPLETED | OUTPATIENT
Start: 2024-12-30 | End: 2024-12-30

## 2024-12-30 RX ORDER — AMOXICILLIN 250 MG
2 CAPSULE ORAL 2 TIMES DAILY PRN
Status: DISCONTINUED | OUTPATIENT
Start: 2024-12-30 | End: 2024-12-31 | Stop reason: HOSPADM

## 2024-12-30 RX ORDER — BISACODYL 5 MG/1
5 TABLET, DELAYED RELEASE ORAL DAILY PRN
Status: DISCONTINUED | OUTPATIENT
Start: 2024-12-30 | End: 2024-12-31 | Stop reason: HOSPADM

## 2024-12-30 RX ADMIN — METHYLPREDNISOLONE SODIUM SUCCINATE 80 MG: 125 INJECTION INTRAMUSCULAR; INTRAVENOUS at 12:13

## 2024-12-30 RX ADMIN — DEXAMETHASONE SODIUM PHOSPHATE 4 MG: 4 INJECTION, SOLUTION INTRAMUSCULAR; INTRAVENOUS at 17:32

## 2024-12-30 RX ADMIN — DEXAMETHASONE SODIUM PHOSPHATE 4 MG: 4 INJECTION, SOLUTION INTRAMUSCULAR; INTRAVENOUS at 23:40

## 2024-12-30 RX ADMIN — Medication 10 ML: at 14:29

## 2024-12-30 RX ADMIN — HYDROCODONE BITARTRATE AND ACETAMINOPHEN 1 TABLET: 7.5; 325 TABLET ORAL at 14:29

## 2024-12-30 RX ADMIN — AMLODIPINE BESYLATE 5 MG: 5 TABLET ORAL at 17:32

## 2024-12-30 RX ADMIN — HYDROMORPHONE HYDROCHLORIDE 0.5 MG: 1 INJECTION, SOLUTION INTRAMUSCULAR; INTRAVENOUS; SUBCUTANEOUS at 16:05

## 2024-12-30 RX ADMIN — LIDOCAINE 1 PATCH: 4 PATCH TOPICAL at 17:32

## 2024-12-30 NOTE — H&P
[facility]   HISTORY AND PHYSICAL    Patient Name: Greg Adkins  : 1980  MRN: [id number]  Primary Care Physician:  Nathen Recio MD  Date of admission: 2024    Subjective   Subjective     Chief Complaint:   Chief Complaint   Patient presents with    Back Pain     Seen at   for back pain diagnosed with sprain, now having pain radiating down left arm and tingling in left hand          HPI:    Greg Adkins is a 44 y.o. male with past medical history of  hypothyroidism, sleep apnea with CPAP, and obesity who presents with complaint of upper back pain that he points to on the left side of his upper back in line with his shoulder blade that radiates down his left arm and associated with new left arm/hand numbness and tingling.  Patient denies any decrease in his strength.  Patient describes the pain as sharp and that it waxes and wanes in intensity but it is constant.  Patient reports that it started mid December around the  without any known trauma or inciting event.  Patient reports that he tried heat and ice and over-the-counter Tylenol and ibuprofen for 2 to 3 days without any improvement.  He then went to a chiropractor for an adjustment and had mild improvement for a day or so then pain continued and he went to an urgent care where he was prescribed a Medrol Dosepak and Flexeril.  Patient reports that his pain seemed to improve a little bit at the beginning of the Medrol Dosepak but acutely worsened towards the end of it over these last few days the patient came to the ED.  Patient denies any fevers or chills, chest pain, dyspnea, abdominal pain, nausea or vomiting, headaches, or peripheral edema.    Review of Systems   All systems were reviewed and negative except for: All pertinent findings listed in the above HPI    Personal History     Past Medical History:   Diagnosis Date    ADHD (attention deficit hyperactivity disorder)     Anxiety     Since i was a kid     Hyperlipidemia     Hypothyroidism     Kidney stones        Past Surgical History:   Procedure Laterality Date    TONSILLECTOMY  1978    6 months olq    VASECTOMY  2005       Family History: family history includes Alcohol abuse in his paternal aunt; Arthritis in his paternal grandfather; Cancer in his maternal grandmother; Diabetes in his brother; Diabetes type I in his brother; No Known Problems in his daughter, father, mother, and son; Scoliosis in his brother; Vision loss in his paternal grandmother. Otherwise pertinent FHx was reviewed and not pertinent to current issue.    Social History:  reports that he quit smoking about 10 years ago. His smoking use included pipe and cigars. He has never used smokeless tobacco. He reports that he does not currently use alcohol. He reports that he does not use drugs.    Home Medications:  Diclofenac Sodium, cyclobenzaprine, and methylphenidate    Allergies:  No Known Allergies    Objective   Objective     Vitals:   Temp:  [97.3 °F (36.3 °C)] 97.3 °F (36.3 °C)  Heart Rate:  [75-92] 75  Resp:  [16] 16  BP: (145-163)/() 145/97  Physical Exam    Constitutional: Awake, alert, well-groomed overall healthy appearing male, nontoxic-appearing   Eyes: PERRL, sclerae anicteric, no conjunctival injection, EOMI   HENT: NCAT, mucous membranes moist normal hearing   Neck: Supple,trachea midline   Respiratory: Clear to auscultation bilaterally, nonlabored respirations on room air   Cardiovascular: RRR, no murmurs, palpable pedal pulses bilaterally   Gastrointestinal: Positive bowel sounds, soft, nontender, nondistended, obese abdomen   Musculoskeletal: No bilateral ankle edema, pain is medial of left scapula with improvement with palpation, no tenderness overlying the cervical or thoracic spine no muscle spasms appreciated   Psychiatric: Appropriate affect, cooperative   Neurologic: Oriented x 3, strength symmetric in all extremities, Cranial Nerves grossly intact to confrontation,  speech clear   Skin: No rashes     Result Review    Result Review:  I have personally reviewed the results from the time of this admission to 12/30/2024 14:12 EST and agree with these findings:  [x]  Laboratory list / accordion  []  Microbiology  [x]  Radiology  []  EKG/Telemetry   []  Cardiology/Vascular   []  Pathology  []  Old records  []  Other:  Most notable findings include: Serum creatinine 1.17, AST ALT within normal limits, WBC 9.13, hemoglobin 17.2    X-rays of cervical spine on 12/23/2024 showed mild anterolisthesis of C4 on C5 no acute fracture identified, borderline fullness of the lower cervical prevertebral soft tissues, no prominent osseous narrowing of the neural foraminal and MRI recommended if further evaluation needed.      Assessment & Plan   Assessment / Plan     Brief Patient Summary:  Greg Adkins is a 44 y.o. male who is admitted for upper back pain with left arm pain/numbness    Active Hospital Problems:  Active Hospital Problems    Diagnosis     **Neck pain      Plan:     Upper back pain with left arm pain/numbness:  -Cervical x-ray on 12/23/2024 negative for acute fractures  -MRI cervical and thoracic spine without pending  -Multimodal pain management available  -Neurosurgery consulted  -IV steroids ordered    LARRY on CPAP:  -Okay to use home machine or supplemental O2 as needed    Obesity:  -BMI 46.38, diet and exercise encouraged    History of hypothyroidism:  -Patient reports that he was taken off of his thyroid meds and this issue had resolved on its own      VTE Prophylaxis:  Mechanical VTE prophylaxis orders are present.        CODE STATUS:    Level Of Support Discussed With: Patient  Code Status (Patient has no pulse and is not breathing): CPR (Attempt to Resuscitate)  Medical Interventions (Patient has pulse or is breathing): Full Support    Admission Status:  I believe this patient meets observation status.    Electronically signed by Moon Main PA-C, 12/30/24, 2:12 PM  EST.        75 minutes has been spent by [facility] providers in the care of this patient while under observation status      I have worn appropriate PPE during this patient encounter, sanitized my hands both with entering and exiting patient's room.    I have discussed plan of care with patient including advance care plan and/or surrogate decision maker.  Patient advises that their wife Emily will be their primary surrogate decision maker

## 2024-12-30 NOTE — PLAN OF CARE
Goal Outcome Evaluation:              Outcome Evaluation: pt is a 44 year old male admitted to the obs unit for neck pain, pt is aox4, vss, pt states his left upper back hurts and radiates into his neck, mri is pending, nuerosurgery is consulted, pt to be npo at midnight, pt has no further questions at this time, pt is resting at this time

## 2024-12-30 NOTE — ED NOTES
.Nursing report ED to floor  Greg Adkins  44 y.o.  male    HPI :  HPI  Stated Reason for Visit: back/neck pain down left arm  History Obtained From: patient    Chief Complaint  Chief Complaint   Patient presents with    Back Pain     Seen at  12/23 for back pain diagnosed with sprain, now having pain radiating down left arm and tingling in left hand        Admitting doctor:   Sadia Domingo MD    Admitting diagnosis:   The primary encounter diagnosis was Cervical radiculopathy. A diagnosis of Left hand weakness was also pertinent to this visit.    Code status:   Current Code Status       Date Active Code Status Order ID Comments User Context       12/30/2024 1325 CPR (Attempt to Resuscitate) [id number]  Moon Main PA-C ED        Question Answer    Code Status (Patient has no pulse and is not breathing) CPR (Attempt to Resuscitate)    Medical Interventions (Patient has pulse or is breathing) Full Support    Level Of Support Discussed With Patient                    Allergies:   Patient has no known allergies.    Isolation:   No active isolations    Intake and Output  No intake or output data in the 24 hours ending 12/30/24 1334    Weight:   There were no vitals filed for this visit.    Most recent vitals:   Vitals:    12/30/24 1033 12/30/24 1108 12/30/24 1315   BP:  (!) 163/116 145/97   Pulse: 92  75   Resp: 16     Temp: 97.3 °F (36.3 °C)     TempSrc: Tympanic     SpO2: 98%         Active LDAs/IV Access:   Lines, Drains & Airways       Active LDAs       Name Placement date Placement time Site Days    Peripheral IV 12/30/24 1213 Anterior;Distal;Left;Upper Arm 12/30/24  1213  Arm  less than 1                    Labs (abnormal labs have a star):   Labs Reviewed   COMPREHENSIVE METABOLIC PANEL - Abnormal; Notable for the following components:       Result Value    Glucose 106 (*)     All other components within normal limits    Narrative:     GFR Categories in Chronic Kidney Disease (CKD)      GFR Category           GFR (mL/min/1.73)    Interpretation  G1                     90 or greater         Normal or high (1)  G2                      60-89                Mild decrease (1)  G3a                   45-59                Mild to moderate decrease  G3b                   30-44                Moderate to severe decrease  G4                    15-29                Severe decrease  G5                    14 or less           Kidney failure          (1)In the absence of evidence of kidney disease, neither GFR category G1 or G2 fulfill the criteria for CKD.    eGFR calculation 2021 CKD-EPI creatinine equation, which does not include race as a factor   CBC WITH AUTO DIFFERENTIAL - Abnormal; Notable for the following components:    Immature Grans % 1.1 (*)     Immature Grans, Absolute 0.10 (*)     All other components within normal limits   CBC AND DIFFERENTIAL    Narrative:     The following orders were created for panel order CBC & Differential.  Procedure                               Abnormality         Status                     ---------                               -----------         ------                     CBC Auto Differential[[id number]]        Abnormal            Final result                 Please view results for these tests on the individual orders.       EKG:   No orders to display       Meds given in ED:   Medications   sodium chloride 0.9 % flush 10 mL (has no administration in time range)   sodium chloride 0.9 % flush 10 mL (has no administration in time range)   sodium chloride 0.9 % infusion 40 mL (has no administration in time range)   Potassium Replacement - Follow Nurse / BPA Driven Protocol (has no administration in time range)   Magnesium Standard Dose Replacement - Follow Nurse / BPA Driven Protocol (has no administration in time range)   Phosphorus Replacement - Follow Nurse / BPA Driven Protocol (has no administration in time range)   Calcium Replacement - Follow Nurse / BPA Driven Protocol (has no  administration in time range)   acetaminophen (TYLENOL) tablet 650 mg (has no administration in time range)     Or   acetaminophen (TYLENOL) 160 MG/5ML oral solution 650 mg (has no administration in time range)     Or   acetaminophen (TYLENOL) suppository 650 mg (has no administration in time range)   methocarbamol (ROBAXIN) tablet 500 mg (has no administration in time range)   Lidocaine 4 % 1 patch (has no administration in time range)   HYDROcodone-acetaminophen (NORCO) 5-325 MG per tablet 1 tablet (has no administration in time range)   HYDROcodone-acetaminophen (NORCO) 5-325 MG per tablet 1 tablet (has no administration in time range)   sennosides-docusate (PERICOLACE) 8.6-50 MG per tablet 2 tablet (has no administration in time range)     And   polyethylene glycol (MIRALAX) packet 17 g (has no administration in time range)     And   bisacodyl (DULCOLAX) EC tablet 5 mg (has no administration in time range)     And   bisacodyl (DULCOLAX) suppository 10 mg (has no administration in time range)   methylPREDNISolone sodium succinate (SOLU-Medrol) injection 80 mg (80 mg Intravenous Given 12/30/24 1213)       Imaging results:  No radiology results for the last day    Ambulatory status:   - ambulatory    Social issues:   Social History     Socioeconomic History    Marital status:    Tobacco Use    Smoking status: Former     Types: Pipe, Cigars     Quit date: 1/1/2015     Years since quitting: 10.0    Smokeless tobacco: Never    Tobacco comments:     Occasional pipe   Vaping Use    Vaping status: Never Used   Substance and Sexual Activity    Alcohol use: Not Currently     Comment: very rare, maybe once a year     Drug use: No    Sexual activity: Yes     Partners: Female     Birth control/protection: None, Vasectomy       Peripheral Neurovascular  Peripheral Neurovascular (Adult)  Peripheral Neurovascular WDL: .WDL except, neurovascular assessment upper  LUE Neurovascular Assessment  Temperature LUE: warm  Color  LUE: no discoloration  Sensation LUE: tingling present    Neuro Cognitive  Neuro Cognitive (Adult)  Cognitive/Neuro/Behavioral WDL: WDL    Learning  Learning Assessment  Learning Readiness and Ability: no barriers identified    Respiratory  Respiratory WDL  Respiratory WDL: WDL    Abdominal Pain       Pain Assessments  Pain (Adult)  (0-10) Pain Rating: Rest: 8  (0-10) Pain Rating: Activity: 8  Pain Location: back    NIH Stroke Scale       Sheryl De La Graza RN  12/30/24 13:34 EST

## 2024-12-30 NOTE — ED PROVIDER NOTES
SHARED VISIT: This visit was performed by BOTH a physician and an APC. The substantive portion of the medical decision making was performed by this attesting physician who made or approved the management plan and takes responsibility for patient management. All studies in the APC note (if performed) were independently interpreted by me.     The TY and I have discussed this patient's history, physical exam, and treatment plan.  I have reviewed the documentation and personally had a face to face interaction with the patient. I affirm the documentation and agree with the treatment and plan.  The attached note describes my personal findings.      I provided a substantive portion of the care of the patient.  I personally performed the physical exam in its entirety, and below are my findings.     Brief history of present illness: 44-year-old male who is left-hand dominant works as  noted left-sided neck pain with some radiation of discomfort about 4 days before [place] but is progressed to the point where he is having more difficulty sleeping at night because of the pain and some weakness of the left hand though he denies sensory deficit.  No known trauma.    Physical exam:    BP (!) 163/116   Pulse 92   Temp 97.3 °F (36.3 °C) (Tympanic)   Resp 16   SpO2 98%       Physical Exam   Constitutional: No distress.  Nontoxic  HENT:  Head: Normocephalic and atraumatic.   Oropharynx: Mucous membranes are moist.   Eyes: . No scleral icterus.   Neck: Normal range of motion. Neck supple.   Cardiovascular: Pink warm and well perfused throughout.    Pulmonary/Chest: No respiratory distress.    Abdominal: Soft. There is no rebound or rigidity  Musculoskeletal: Moves all extremities equally.  No external erythema or swelling abnormalities of the left upper extremity.  Neurological: Alert and oriented.  Speech fluent and easily intelligible.  Sensation to light touch is intact throughout the median, radial, ulnar nerve  distributions left hand.  Skin: Skin is pink, warm, and dry.   Psychiatric: Mood and affect normal.   Nursing note and vitals reviewed.        MDM:  My differential includes but is not limited to neck pain, cervical contusion, degenerative disc disease, herniated disc, acute cervical strain, cervical radiculopathy, cervical fracture, torticollis, carotid artery dissection and vertebral artery dissection    Please note that portions of this were completed with a voice recognition program.       Note Disclaimer: At [facility], we believe that sharing information builds trust and better relationships. You are receiving this note because you are receiving care at [facility] or recently visited. It is possible you will see health information before a provider has talked with you about it. This kind of information can be easy to misunderstand. To help you fully understand what it means for your health, we urge you to discuss this note with your provider.     Rishabh Bunn MD  12/30/24 2109

## 2024-12-30 NOTE — ED PROVIDER NOTES
EMERGENCY DEPARTMENT ENCOUNTER  Room Number:  S07/07  PCP: Nathen Recio MD  Independent Historians: Patient      HPI:  Chief Complaint: had concerns including Back Pain (Seen at  12/23 for back pain diagnosed with sprain, now having pain radiating down left arm and tingling in left hand ).     A complete HPI/ROS/PMH/PSH/SH/FH are unobtainable due to: None    Chronic or social conditions impacting patient care (Social Determinants of Health): None      Context: The patient is a 44 y.o. male with a medical history of kidney stones, hypertension, adult ADHD, hyperlipidemia who presents to the ED c/o acute pain in the left neck, posterior shoulder, down the left arm.  It started about 10 days ago, denies any injury.  He does work as an , is left-hand dominant.  He reports some tingling odd feeling in his left fourth and fifth fingers, feels that his strength is a little decreased over his baseline.  He states the pain is worse when he is more sedentary or keeping the area still.  Some movement in his hand his fingers helps with the discomfort.  It quite severe at night, gives him a lot of trouble sleeping.  No history of the symptoms.  Denies any numbness or weakness elsewhere, no symptoms in the legs or right upper extremity.      Review of prior external notes (non-ED) -and- Review of prior external test results outside of this encounter: Labs performed 8/22/2024 and creatinine was 1.07, potassium 4.4, total cholesterol 188        PAST MEDICAL HISTORY  Active Ambulatory Problems     Diagnosis Date Noted    Kidney stones     Class 3 severe obesity due to excess calories without serious comorbidity with body mass index (BMI) of 45.0 to 49.9 in adult 08/14/2023    Adult ADHD 01/24/2024    Mild episode of recurrent major depressive disorder 01/24/2024    Primary hypertension 01/24/2024     Resolved Ambulatory Problems     Diagnosis Date Noted    No Resolved Ambulatory Problems     Past Medical  History:   Diagnosis Date    ADHD (attention deficit hyperactivity disorder)     Anxiety     Hyperlipidemia     Hypothyroidism          PAST SURGICAL HISTORY  Past Surgical History:   Procedure Laterality Date    TONSILLECTOMY  1978    6 months olq    VASECTOMY  2005         FAMILY HISTORY  Family History   Problem Relation Age of Onset    No Known Problems Mother     No Known Problems Father     Scoliosis Brother     Diabetes type I Brother     Diabetes Brother     No Known Problems Daughter     No Known Problems Son     Cancer Maternal Grandmother     Arthritis Paternal Grandfather     Vision loss Paternal Grandmother     Alcohol abuse Paternal Aunt          SOCIAL HISTORY  Social History     Socioeconomic History    Marital status:    Tobacco Use    Smoking status: Former     Types: Pipe, Cigars     Quit date: 1/1/2015     Years since quitting: 10.0    Smokeless tobacco: Never    Tobacco comments:     Occasional pipe   Vaping Use    Vaping status: Never Used   Substance and Sexual Activity    Alcohol use: Not Currently     Comment: very rare, maybe once a year     Drug use: No    Sexual activity: Yes     Partners: Female     Birth control/protection: None, Vasectomy         ALLERGIES  Patient has no known allergies.      REVIEW OF SYSTEMS  Review of Systems  Included in HPI  All systems reviewed and negative except for those discussed in HPI.      PHYSICAL EXAM    I have reviewed the triage vital signs and nursing notes.    ED Triage Vitals   Temp Heart Rate Resp BP SpO2   12/30/24 1033 12/30/24 1033 12/30/24 1033 12/30/24 1108 12/30/24 1033   97.3 °F (36.3 °C) 92 16 (!) 163/116 98 %      Temp src Heart Rate Source Patient Position BP Location FiO2 (%)   12/30/24 1033 12/30/24 1033 -- -- --   Tympanic Monitor          Physical Exam  GENERAL: alert, no acute distress  SKIN: Warm, dry  HENT: Normocephalic, atraumatic  EYES: no scleral icterus  CV: regular rhythm, regular rate  RESPIRATORY: normal effort,  lungs clear  ABDOMEN: nondistended  MUSCULOSKELETAL: no deformity.  No C, T, L-spine tenderness.  No tenderness to the neck, left trapezius, or left upper extremity.  Pain is not reproduced with palpation.  Skin is normal in appearance with no rash.  Sensation and motor function are intact in radial ulnar and median nerve distributions, muscle strength is 5 out of 5 throughout the bilateral upper extremities.  Radial pulses 2+, cap refill brisk.  NEURO: alert, moves all extremities, follows commands            LAB RESULTS  Labs Reviewed   COMPREHENSIVE METABOLIC PANEL - Abnormal; Notable for the following components:       Result Value    Glucose 106 (*)     All other components within normal limits    Narrative:     GFR Categories in Chronic Kidney Disease (CKD)      GFR Category          GFR (mL/min/1.73)    Interpretation  G1                     90 or greater         Normal or high (1)  G2                      60-89                Mild decrease (1)  G3a                   45-59                Mild to moderate decrease  G3b                   30-44                Moderate to severe decrease  G4                    15-29                Severe decrease  G5                    14 or less           Kidney failure          (1)In the absence of evidence of kidney disease, neither GFR category G1 or G2 fulfill the criteria for CKD.    eGFR calculation 2021 CKD-EPI creatinine equation, which does not include race as a factor   CBC WITH AUTO DIFFERENTIAL - Abnormal; Notable for the following components:    Immature Grans % 1.1 (*)     Immature Grans, Absolute 0.10 (*)     All other components within normal limits   COMPREHENSIVE METABOLIC PANEL - Abnormal; Notable for the following components:    Glucose 134 (*)     CO2 19.3 (*)     Anion Gap 15.7 (*)     All other components within normal limits    Narrative:     GFR Categories in Chronic Kidney Disease (CKD)      GFR Category          GFR (mL/min/1.73)    Interpretation  G1                      90 or greater         Normal or high (1)  G2                      60-89                Mild decrease (1)  G3a                   45-59                Mild to moderate decrease  G3b                   30-44                Moderate to severe decrease  G4                    15-29                Severe decrease  G5                    14 or less           Kidney failure          (1)In the absence of evidence of kidney disease, neither GFR category G1 or G2 fulfill the criteria for CKD.    eGFR calculation 2021 CKD-EPI creatinine equation, which does not include race as a factor   CBC WITH AUTO DIFFERENTIAL - Abnormal; Notable for the following components:    WBC 20.78 (*)     Hemoglobin 18.2 (*)     MCHC 35.8 (*)     Neutrophil % 88.7 (*)     Lymphocyte % 8.0 (*)     Monocyte % 2.5 (*)     Eosinophil % 0.0 (*)     Immature Grans % 0.7 (*)     Neutrophils, Absolute 18.43 (*)     Immature Grans, Absolute 0.15 (*)     All other components within normal limits   CBC AND DIFFERENTIAL    Narrative:     The following orders were created for panel order CBC & Differential.  Procedure                               Abnormality         Status                     ---------                               -----------         ------                     CBC Auto Differential[[id number]]        Abnormal            Final result                 Please view results for these tests on the individual orders.           RADIOLOGY        MEDICATIONS GIVEN IN ER  Medications   methylPREDNISolone sodium succinate (SOLU-Medrol) injection 80 mg (has no administration in time range)         ORDERS PLACED DURING THIS VISIT:  Orders Placed This Encounter   Procedures    Comprehensive Metabolic Panel    CBC Auto Differential    CBC & Differential         OUTPATIENT MEDICATION MANAGEMENT:  Current Facility-Administered Medications Ordered in Epic   Medication Dose Route Frequency Provider Last Rate Last Admin    methylPREDNISolone sodium  succinate (SOLU-Medrol) injection 80 mg  80 mg Intravenous Once Gwendolyn Benavides PA-C         Current Outpatient Medications Ordered in Epic   Medication Sig Dispense Refill    cyclobenzaprine (FLEXERIL) 10 MG tablet Take 1 tablet by mouth 3 (Three) Times a Day As Needed for Muscle Spasms for up to 7 days. 21 tablet 0    Diclofenac Sodium (VOLTAREN) 1 % gel gel Apply 2 g topically to the appropriate area as directed 3 (Three) Times a Day As Needed (pain) for up to 10 days. 100 g 0    methylphenidate (Concerta) 27 MG CR tablet Take 1 tablet by mouth Every Morning 90 tablet 0         PROCEDURES  Procedures            PROGRESS, DATA ANALYSIS, CONSULTS, AND MEDICAL DECISION MAKING  All labs have been independently interpreted by me.  All radiology studies have been reviewed by me. All EKG's have been independently viewed and interpreted by me.  Discussion below represents my analysis of pertinent findings related to patient's condition, differential diagnosis, treatment plan and final disposition.    DIFFERENTIAL    My differential diagnosis includes but is not limited to cervical radiculopathy, spinal stenosis, herniated disc, degenerative disc disease, myelopathy    Clinical Scores:                  ED Course as of 12/31/24 2153   Mon Dec 30, 2024   1244 WBC: 9.13 [KA]   1244 Hemoglobin: 17.2 [KA]   1244 Glucose(!): 106 [KA]   1244 Creatinine: 1.17 [KA]   1320 I discussed the patient with Moon Main PA-C for the ED observation unit including history presentation workup and she agrees to admit [KA]      ED Course User Index  [KA] Gwendolyn Benavides PA-C             AS OF 12:06 EST VITALS:    BP - (!) 163/116  HR - 92  TEMP - 97.3 °F (36.3 °C) (Tympanic)  O2 SATS - 98%    COMPLEXITY OF CARE  The patient requires admission.      DIAGNOSIS  Final diagnoses:   Cervical radiculopathy   Left hand weakness   Neck pain         DISPOSITION  ED Disposition       ED Disposition   Decision to Admit    Condition   --    Comment    --                        Please note that portions of this document were completed with a voice recognition program.    Note Disclaimer: At [facility], we believe that sharing information builds trust and better relationships. You are receiving this note because you recently visited [facility]. It is possible you will see health information before a provider has talked with you about it. This kind of information can be easy to misunderstand. To help you fully understand what it means for your health, we urge you to discuss this note with your provider.         Gwendolyn Benavides PA-C  12/31/24 3740

## 2024-12-31 ENCOUNTER — READMISSION MANAGEMENT (OUTPATIENT)
Dept: CALL CENTER | Facility: HOSPITAL | Age: 44
End: 2024-12-31
Payer: COMMERCIAL

## 2024-12-31 VITALS
RESPIRATION RATE: 18 BRPM | HEART RATE: 91 BPM | WEIGHT: 305 LBS | HEIGHT: 68 IN | BODY MASS INDEX: 46.23 KG/M2 | DIASTOLIC BLOOD PRESSURE: 75 MMHG | OXYGEN SATURATION: 96 % | TEMPERATURE: 97.8 F | SYSTOLIC BLOOD PRESSURE: 141 MMHG

## 2024-12-31 DIAGNOSIS — M54.2 NECK PAIN: Primary | ICD-10-CM

## 2024-12-31 LAB
ALBUMIN SERPL-MCNC: 4.5 G/DL (ref 3.5–5.2)
ALBUMIN/GLOB SERPL: 1.6 G/DL
ALP SERPL-CCNC: 71 U/L (ref 39–117)
ALT SERPL W P-5'-P-CCNC: 35 U/L (ref 1–41)
ANION GAP SERPL CALCULATED.3IONS-SCNC: 15.7 MMOL/L (ref 5–15)
AST SERPL-CCNC: 21 U/L (ref 1–40)
BASOPHILS # BLD AUTO: 0.02 10*3/MM3 (ref 0–0.2)
BASOPHILS NFR BLD AUTO: 0.1 % (ref 0–1.5)
BILIRUB SERPL-MCNC: 0.7 MG/DL (ref 0–1.2)
BUN SERPL-MCNC: 17 MG/DL (ref 6–20)
BUN/CREAT SERPL: 17.5 (ref 7–25)
CALCIUM SPEC-SCNC: 9.2 MG/DL (ref 8.6–10.5)
CHLORIDE SERPL-SCNC: 103 MMOL/L (ref 98–107)
CO2 SERPL-SCNC: 19.3 MMOL/L (ref 22–29)
CREAT SERPL-MCNC: 0.97 MG/DL (ref 0.76–1.27)
DEPRECATED RDW RBC AUTO: 42.9 FL (ref 37–54)
EGFRCR SERPLBLD CKD-EPI 2021: 97.5 ML/MIN/1.73
EOSINOPHIL # BLD AUTO: 0 10*3/MM3 (ref 0–0.4)
EOSINOPHIL NFR BLD AUTO: 0 % (ref 0.3–6.2)
ERYTHROCYTE [DISTWIDTH] IN BLOOD BY AUTOMATED COUNT: 13.5 % (ref 12.3–15.4)
GLOBULIN UR ELPH-MCNC: 2.8 GM/DL
GLUCOSE SERPL-MCNC: 134 MG/DL (ref 65–99)
HCT VFR BLD AUTO: 50.8 % (ref 37.5–51)
HGB BLD-MCNC: 18.2 G/DL (ref 13–17.7)
IMM GRANULOCYTES # BLD AUTO: 0.15 10*3/MM3 (ref 0–0.05)
IMM GRANULOCYTES NFR BLD AUTO: 0.7 % (ref 0–0.5)
LYMPHOCYTES # BLD AUTO: 1.66 10*3/MM3 (ref 0.7–3.1)
LYMPHOCYTES NFR BLD AUTO: 8 % (ref 19.6–45.3)
MCH RBC QN AUTO: 31.6 PG (ref 26.6–33)
MCHC RBC AUTO-ENTMCNC: 35.8 G/DL (ref 31.5–35.7)
MCV RBC AUTO: 88.2 FL (ref 79–97)
MONOCYTES # BLD AUTO: 0.52 10*3/MM3 (ref 0.1–0.9)
MONOCYTES NFR BLD AUTO: 2.5 % (ref 5–12)
NEUTROPHILS NFR BLD AUTO: 18.43 10*3/MM3 (ref 1.7–7)
NEUTROPHILS NFR BLD AUTO: 88.7 % (ref 42.7–76)
NRBC BLD AUTO-RTO: 0 /100 WBC (ref 0–0.2)
PLATELET # BLD AUTO: 283 10*3/MM3 (ref 140–450)
PMV BLD AUTO: 8.9 FL (ref 6–12)
POTASSIUM SERPL-SCNC: 4.7 MMOL/L (ref 3.5–5.2)
PROT SERPL-MCNC: 7.3 G/DL (ref 6–8.5)
RBC # BLD AUTO: 5.76 10*6/MM3 (ref 4.14–5.8)
SODIUM SERPL-SCNC: 138 MMOL/L (ref 136–145)
WBC NRBC COR # BLD AUTO: 20.78 10*3/MM3 (ref 3.4–10.8)

## 2024-12-31 PROCEDURE — 80053 COMPREHEN METABOLIC PANEL: CPT | Performed by: EMERGENCY MEDICINE

## 2024-12-31 PROCEDURE — 99204 OFFICE O/P NEW MOD 45 MIN: CPT | Performed by: NEUROLOGICAL SURGERY

## 2024-12-31 PROCEDURE — 25010000002 DEXAMETHASONE PER 1 MG: Performed by: EMERGENCY MEDICINE

## 2024-12-31 PROCEDURE — G0378 HOSPITAL OBSERVATION PER HR: HCPCS

## 2024-12-31 PROCEDURE — 85025 COMPLETE CBC W/AUTO DIFF WBC: CPT | Performed by: EMERGENCY MEDICINE

## 2024-12-31 RX ORDER — FAMOTIDINE 20 MG/1
20 TABLET, FILM COATED ORAL 2 TIMES DAILY
Qty: 14 TABLET | Refills: 0 | Status: SHIPPED | OUTPATIENT
Start: 2024-12-31

## 2024-12-31 RX ORDER — DEXAMETHASONE 1.5 MG/1
TABLET ORAL
Qty: 51 TABLET | Refills: 0 | Status: SHIPPED | OUTPATIENT
Start: 2024-12-31

## 2024-12-31 RX ORDER — CYCLOBENZAPRINE HCL 10 MG
10 TABLET ORAL 3 TIMES DAILY PRN
Qty: 21 TABLET | Refills: 0 | Status: SHIPPED | OUTPATIENT
Start: 2024-12-31 | End: 2025-01-07

## 2024-12-31 RX ORDER — HYDROCODONE BITARTRATE AND ACETAMINOPHEN 7.5; 325 MG/1; MG/1
1 TABLET ORAL EVERY 6 HOURS PRN
Qty: 12 TABLET | Refills: 0 | Status: SHIPPED | OUTPATIENT
Start: 2024-12-31 | End: 2025-01-03

## 2024-12-31 RX ADMIN — DEXAMETHASONE SODIUM PHOSPHATE 4 MG: 4 INJECTION, SOLUTION INTRAMUSCULAR; INTRAVENOUS at 05:10

## 2024-12-31 RX ADMIN — HYDROCODONE BITARTRATE AND ACETAMINOPHEN 1 TABLET: 5; 325 TABLET ORAL at 11:34

## 2024-12-31 RX ADMIN — ACETAMINOPHEN 650 MG: 325 TABLET ORAL at 05:27

## 2024-12-31 RX ADMIN — DEXAMETHASONE SODIUM PHOSPHATE 4 MG: 4 INJECTION, SOLUTION INTRAMUSCULAR; INTRAVENOUS at 11:27

## 2024-12-31 RX ADMIN — LIDOCAINE 1 PATCH: 4 PATCH TOPICAL at 08:26

## 2024-12-31 RX ADMIN — Medication 10 ML: at 08:26

## 2024-12-31 RX ADMIN — AMLODIPINE BESYLATE 5 MG: 5 TABLET ORAL at 05:27

## 2024-12-31 NOTE — OUTREACH NOTE
Prep Survey      Flowsheet Row Responses   [facility] patient discharged from? [place]   Is LACE score < 7 ? Yes   Eligibility [facility]   Date of Admission 12/30/24   Date of Discharge 12/31/24   Discharge Disposition Home or Self Care   Discharge diagnosis Neck pain   Does the patient have one of the following disease processes/diagnoses(primary or secondary)? Other   Does the patient have Home health ordered? No   Is there a DME ordered? No   Prep survey completed? Yes            FRANKO SALCIDO - Registered Nurse

## 2024-12-31 NOTE — PLAN OF CARE
Goal Outcome Evaluation:   Pt d/c via private vehicle. IV removed. Belongings returned. Follow up instructions given. No further questions/concerns at this time,

## 2024-12-31 NOTE — PLAN OF CARE
Goal Outcome Evaluation:      Pt in fro neck/back pain, vitals improved overnight, NPO since midnight, awaiting NS consult in AM, bed low, wheels locked, call light in reach

## 2024-12-31 NOTE — CONSULTS
Patient Care Team:  Nathen Recio MD as PCP - General (Family Medicine)  Nathen Mcmillan MD as Surgeon (Hand Surgery)    Chief complaint neck and arm pain    Subjective .     History of present illness: This patient has been having pain in the left side of his neck into his left chest and down his left arm for the last 2 or 3 weeks.  Became more severe about a week and a half ago and he went to urgent care.  He got some oral steroids which helped.  Ultimately however he also saw his chiropractor and the symptomatic relief began to dissipate.  He came in yesterday due to severe pain in the left side of his back radiating down his left arm.  He goes all the way down his arm and into his left hand.  He has numbness and tingling in his left hand particularly in the middle portion of his left hand.  The right side is okay.  He has no difficulty with bowel or bladder control or other associated symptoms.  He has been on IV steroids here and they have helped quite a bit.    Review of Systems  Pertinent items are noted in HPI.  History    Past Medical History:   Diagnosis Date    ADHD (attention deficit hyperactivity disorder)     Anxiety     Since i was a kid    Hyperlipidemia     Hypothyroidism     Kidney stones    ,   Past Surgical History:   Procedure Laterality Date    TONSILLECTOMY  1978    6 months olq    VASECTOMY  2005   ,   Family History   Problem Relation Age of Onset    No Known Problems Mother     No Known Problems Father     Scoliosis Brother     Diabetes type I Brother     Diabetes Brother     No Known Problems Daughter     No Known Problems Son     Cancer Maternal Grandmother     Arthritis Paternal Grandfather     Vision loss Paternal Grandmother     Alcohol abuse Paternal Aunt    ,   Social History     Socioeconomic History    Marital status:    Tobacco Use    Smoking status: Former     Types: Pipe, Cigars     Quit date: 1/1/2015     Years since quitting: 10.0    Smokeless  tobacco: Never    Tobacco comments:     Occasional pipe   Vaping Use    Vaping status: Never Used   Substance and Sexual Activity    Alcohol use: Not Currently     Comment: very rare, maybe once a year     Drug use: No    Sexual activity: Yes     Partners: Female     Birth control/protection: None, Vasectomy     E-cigarette/Vaping    E-cigarette/Vaping Use Never User     Passive Exposure No     Counseling Given No      E-cigarette/Vaping Substances    Nicotine No     THC No     CBD No     Flavoring No      E-cigarette/Vaping Devices    Disposable No     Pre-filled or Refillable Cartridge No     Refillable Tank No     Pre-filled Pod No          ,   Medications Prior to Admission   Medication Sig Dispense Refill Last Dose/Taking    [] cyclobenzaprine (FLEXERIL) 10 MG tablet Take 1 tablet by mouth 3 (Three) Times a Day As Needed for Muscle Spasms for up to 7 days. 21 tablet 0 2024 Morning    methylphenidate (Concerta) 27 MG CR tablet Take 1 tablet by mouth Every Morning 90 tablet 0 Past Week Morning    Diclofenac Sodium (VOLTAREN) 1 % gel gel Apply 2 g topically to the appropriate area as directed 3 (Three) Times a Day As Needed (pain) for up to 10 days. 100 g 0     [] predniSONE (DELTASONE) 10 MG (21) dose pack Take  by mouth Take As Directed for 6 days. Use as directed on package 21 tablet 0    , and Allergies:  Patient has no known allergies.    Objective     Vital Signs   Temp:  [97.3 °F (36.3 °C)-98.8 °F (37.1 °C)] 97.8 °F (36.6 °C)  Heart Rate:  [] 91  Resp:  [16-18] 18  BP: (137-200)/() 141/75    Physical Exam:   Physical Exam  Neurological:      Mental Status: He is alert.      Cranial Nerves: Cranial nerves 2-12 are intact.      Sensory: Sensation is intact.      Motor: Motor function is intact.      Coordination: Coordination is intact.      Gait: Gait is intact.      Deep Tendon Reflexes: Reflexes are normal and symmetric.             Results Review:   I reviewed the patient's  new clinical results.  I reviewed an MRI that was done yesterday afternoon.  This looks pretty good except at C6-7 where there is a herniated disc to the left side.    Assessment & Plan       Neck pain      I told the patient that since he is feeling better with the IV steroids we can send him home today on oral steroids.  We will arrange outpatient physical therapy and an outpatient cervical epidural block for him.  If that helps then we do not need to do anything further and if it does not help we can discuss surgery at that point.  I will put a prescription in for the oral steroids and have him call my office to schedule follow-up.    I discussed the patient's findings and my recommendations with patient and family    Lang Foster MD  12/31/24  13:48 EST

## 2024-12-31 NOTE — PROGRESS NOTES
NEISHA RIVAS Attestation Note    I supervised care provided by the midlevel provider.    The TY and I have discussed this patient's history, physical exam, and treatment plan. I have reviewed the documentation and personally had a face to face interaction with the patient  I affirm the documentation and agree with the treatment and plan. I provided a substantive portion of the care of this patient.  I personally performed the physical exam, in its entirety.  My personal findings are documented in below:    History:  Patient admitted to observation unit for further  management of pain in the upper back that radiates into the left shoulder and left arm as well as new onset numbness in the left arm and hand.  This morning he says he is feeling better.  Still having some numbness and discomfort in the left upper arm but it is diminished in comparison to recent days.  Denies any chest pain.  Denies dyspnea.    Physical Exam:  General: No acute distress.  HENT: NCAT  Eyes: no scleral icterus.  Neck: Painless range of motion  CV: Pink warm and well-perfused throughout  Respiratory: No distress or increased work of breathing  Abdomen: soft, no focal tenderness or rigidity  Musculoskeletal: no deformity.,  No asymmetry of extremities.  Neuro: Alert, speech fluent and easily intelligible  Skin: warm, dry.    Assessment and Plan:  Upper back/left arm pain and numbness: MRI cervical spine - suggestion of a  left foraminal disc protrusion or small disc herniation that narrows the  left neural foramen and could affect the exiting left C7 nerve root.  MRI thoracic spine -  At T7-8 and T8-9 there are small posterior central to left  paracentral disc protrusions or herniations that minimally indent the  ventral thecal sac, result in minimal if any canal narrowing. At T9-10  there is minimal posterior disc bulge with no associated canal  narrowing.  Will continue IV steroids and multimodal pain management.  Neurosurgery  consulted.    Elevated blood pressure reading: No prior history of hypertension.  However vital signs have shown repeated elevated BP readings.  This may be in part due to pain as well as steroid therapy.  Started on amlodipine overnight.  Will continue to monitor vital signs.

## 2024-12-31 NOTE — DISCHARGE SUMMARY
ED OBSERVATION PROGRESS/DISCHARGE SUMMARY    Date of Admission: 12/30/2024   LOS: 0 days   PCP: Nathen Recio MD    Final Diagnosis: Neck pain with left-sided radiculopathy    Hospital Outcome:     44 y.o. male with past medical history of  hypothyroidism, sleep apnea with CPAP, and obesity who presents with complaint of upper back pain that he points to on the left side of his upper back in line with his shoulder blade that radiates down his left arm and associated with new left arm/hand numbness and tingling.  Patient denies any decrease in his strength.  Patient describes the pain as sharp and that it waxes and wanes in intensity but it is constant.  Patient reports that it started mid December around the 17th without any known trauma or inciting event.  Patient reports that he tried heat and ice and over-the-counter Tylenol and ibuprofen for 2 to 3 days without any improvement.  He then went to a chiropractor for an adjustment and had mild improvement for a day or so then pain continued and he went to an urgent care where he was prescribed a Medrol Dosepak and Flexeril.  Patient reports that his pain seemed to improve a little bit at the beginning of the Medrol Dosepak but acutely worsened towards the end of it over these last few days the patient came to the ED.  Patient denies any fevers or chills, chest pain, dyspnea, abdominal pain, nausea or vomiting, headaches, or peripheral edema.     5/31/2024  CBC significant for leukocytosis this morning, likely related to IV steroids.  Neurosurgery was consulted.  MRI cervical spine shows herniated disc to the left at C6-7.  They recommend continuing oral steroids.  He already has muscle relaxers at home, will send narcotics as needed.  If his pain is not improved he was encouraged to call Dr. Foster' office for an outpatient cervical epidural block.  I have ordered outpatient physical therapy.  He will be discharged home.    ROS:  General: no fevers,  chills  Respiratory: no cough, dyspnea  Cardiovascular: no chest pain, palpitations  Abdomen: No abdominal pain, nausea, vomiting, or diarrhea  Neurologic: No focal weakness, + left hand numbness    Objective   Physical Exam:  I have reviewed the vital signs.  Temp:  [97.3 °F (36.3 °C)-98.8 °F (37.1 °C)] 97.8 °F (36.6 °C)  Heart Rate:  [] 91  Resp:  [16-18] 18  BP: (137-200)/() 141/75  General Appearance:    Alert, cooperative, no distress  Head:    Normocephalic, atraumatic  Eyes:    Sclerae anicteric  Neck:   Supple, no mass  Lungs: Clear to auscultation bilaterally, respirations unlabored  Heart: Regular rate and rhythm, S1 and S2 normal, no murmur, rub or gallop  Abdomen:  Soft, nontender, bowel sounds active, nondistended  Extremities: No clubbing, cyanosis, or edema to lower extremities  Pulses:  2+ and symmetric in distal lower extremities  Skin: No rashes   Neurologic: Oriented x3, Normal strength to extremities    Results Review:    I have reviewed the labs, radiology results and diagnostic studies.    Results from last 7 days   Lab Units 12/31/24  0518   WBC 10*3/mm3 20.78*   HEMOGLOBIN g/dL 18.2*   HEMATOCRIT % 50.8   PLATELETS 10*3/mm3 283     Results from last 7 days   Lab Units 12/31/24  0518 12/30/24  1204   SODIUM mmol/L 138 139   POTASSIUM mmol/L 4.7 3.9   CHLORIDE mmol/L 103 102   CO2 mmol/L 19.3* 28.2   BUN mg/dL 17 16   CREATININE mg/dL 0.97 1.17   CALCIUM mg/dL 9.2 9.0   BILIRUBIN mg/dL 0.7 0.4   ALK PHOS U/L 71 78   ALT (SGPT) U/L 35 33   AST (SGOT) U/L 21 26   GLUCOSE mg/dL 134* 106*     Imaging Results (Last 24 Hours)       Procedure Component Value Units Date/Time    MRI Cervical Spine Without Contrast [[id number]] Collected: 12/30/24 1740     Updated: 12/31/24 1206    Narrative:      STAT MRI OF THE CERVICAL AND THORACIC SPINE WITHOUT CONTRAST ON  12/30/2024     CLINICAL HISTORY: This is a 44-year-old male patient who has left-sided  lower neck pain and pain extending down left  arm and some numbness and  tingling in left fourth and fifth digits and some upper back pain.     MRI OF THE CERVICAL SPINE TECHNIQUE: Sagittal T1, gradient echo T2 and  fast spin echo T2 and fat saturated fast spin echo T2 weighted images  were obtained of the cervical spine. In addition axial gradient echo T2  weighted images were obtained from the skull base to the T1 thoracic  level. Oblique sagittal T2 weighted images were obtained angled through  the right and left cervical neural foramina.     COMPARISON: This is correlated to a prior cervical spine MRI from  [facility] on 11/11/2017.     FINDINGS: The craniocervical junction is normal in appearance. The  cervical cord and upper thoracic spinal cord is normal in contour and  signal intensity     C1-2 level is normal in appearance.     At C2-3, the disc space, facets and uncovertebral joints are within  normal limits with no canal or foraminal narrowing.     At C3-4, the disc space, facets and uncovertebral joints are within  normal limits with no canal or foraminal narrowing.     There is straightening of the cervical spine with a slight reversal of  normal cervical lordosis centered from C4-C6.     At C4-5 there is minimal anterior marginal endplate spurring. The  posterior disc margin, facets and uncovertebral joints are within normal  limits with no canal or foraminal narrowing.     At C5-6 there is mild disc space narrowing, minimal degenerative  endplate changes. There is very minimal posterior central disc bulge.  There is no canal narrowing. The uncovertebral joints are within normal  limits. There is no foraminal narrowing.     At C6-7, the posterior disc margin is within normal limits with no canal  narrowing and uncovertebral joints are within normal limits. There is no  right foraminal narrowing. There appears to be a subtle disc protrusion  or small disc herniation into the inferior medial aspect of the left  neural foramen  that narrows the medial aspect of the left neural foramen  and could affect the exiting left C7 nerve root and please correlate  clinically as to whether this patient has a left C7 radiculopathy.     At C7-T1 the posterior disc margin and facets are normal with no canal  or foraminal narrowing.        Impression:      1. On sagittal images 11 and 12 mild sequences and on axial gradient  echo T2 weighted image 31 sequence 3 there is strong suggestion of a  left foraminal disc protrusion or small disc herniation that narrows the  medial aspect of the left neural foramen and could affect the exiting  left C7 nerve root and please correlate clinically as to whether this  patient has a left C7 radiculopathy. The remainder of the MRI of the  cervical spine is within normal limits. If the patient has a left C7  radiculopathy consider a myelogram post myelogram CT for a more complete  assessment.     MRI OF THE THORACIC SPINE TECHNIQUE: Sagittal T1, proton density and  fat-suppressed T2 weighted images were obtained of the thoracic spine.  In addition axial T1 and T2 weighted images were obtained from C7-T8 and  then from T7-L1.     COMPARISON: There are no prior thoracic spine imaging studies for  comparison.     FINDINGS: The thoracic cord is normal in signal intensity. The conus  terminates at the L1-2 interspace level which is normal.     At C7-T1, T1-T2, T2-T3, T3-T4, T4-T5, T5-T6 and T6-T7, posterior disc  margin and facets are normal with no canal or foraminal narrowing from  C7-T7.     At T7-8 there is a small left paracentral disc protrusion that indents  the ventral thecal sac, comes close to the ventral surface of the cord  but results in essentially no canal narrowing and there is no foraminal  narrowing.     At T8-9 there is a posterior central disc protrusion that indents the  ventral thecal sac, comes close to the ventral surface of the cord but  only results in minimal canal narrowing and there is no  foraminal  narrowing.     At T9-10 there is minimal posterior disc bulge, the facets are normal,  there is no canal or foraminal narrowing.     At T10-11, T11-12 and T12-L1, the posterior disc margins and facets are  normal with no canal or foraminal narrowing.     IMPRESSION:  1. At T7-8 and T8-9 there are small posterior central to left  paracentral disc protrusions or herniations that minimally indent the  ventral thecal sac, result in minimal if any canal narrowing. At T9-10,  there is a minimal posterior disc bulge with no associated canal  narrowing. The remainder of the thoracic spine MRI is within normal  limits.     The results of this study were communicated to Moon from [facility] by telephone on 12/30/2024 at 5:20 pm.     This report was finalized on 12/31/2024 12:03 PM by Dr. Elia Madrid M.D  on Workstation: [id number]       MRI Thoracic Spine Without Contrast [[id number]] Collected: 12/30/24 1740     Updated: 12/31/24 1206    Narrative:      STAT MRI OF THE CERVICAL AND THORACIC SPINE WITHOUT CONTRAST ON  12/30/2024     CLINICAL HISTORY: This is a 44-year-old male patient who has left-sided  lower neck pain and pain extending down left arm and some numbness and  tingling in left fourth and fifth digits and some upper back pain.     MRI OF THE CERVICAL SPINE TECHNIQUE: Sagittal T1, gradient echo T2 and  fast spin echo T2 and fat saturated fast spin echo T2 weighted images  were obtained of the cervical spine. In addition axial gradient echo T2  weighted images were obtained from the skull base to the T1 thoracic  level. Oblique sagittal T2 weighted images were obtained angled through  the right and left cervical neural foramina.     COMPARISON: This is correlated to a prior cervical spine MRI from  [facility] on 11/11/2017.     FINDINGS: The craniocervical junction is normal in appearance. The  cervical cord and upper thoracic spinal cord is normal in contour  and  signal intensity     C1-2 level is normal in appearance.     At C2-3, the disc space, facets and uncovertebral joints are within  normal limits with no canal or foraminal narrowing.     At C3-4, the disc space, facets and uncovertebral joints are within  normal limits with no canal or foraminal narrowing.     There is straightening of the cervical spine with a slight reversal of  normal cervical lordosis centered from C4-C6.     At C4-5 there is minimal anterior marginal endplate spurring. The  posterior disc margin, facets and uncovertebral joints are within normal  limits with no canal or foraminal narrowing.     At C5-6 there is mild disc space narrowing, minimal degenerative  endplate changes. There is very minimal posterior central disc bulge.  There is no canal narrowing. The uncovertebral joints are within normal  limits. There is no foraminal narrowing.     At C6-7, the posterior disc margin is within normal limits with no canal  narrowing and uncovertebral joints are within normal limits. There is no  right foraminal narrowing. There appears to be a subtle disc protrusion  or small disc herniation into the inferior medial aspect of the left  neural foramen that narrows the medial aspect of the left neural foramen  and could affect the exiting left C7 nerve root and please correlate  clinically as to whether this patient has a left C7 radiculopathy.     At C7-T1 the posterior disc margin and facets are normal with no canal  or foraminal narrowing.        Impression:      1. On sagittal images 11 and 12 mild sequences and on axial gradient  echo T2 weighted image 31 sequence 3 there is strong suggestion of a  left foraminal disc protrusion or small disc herniation that narrows the  medial aspect of the left neural foramen and could affect the exiting  left C7 nerve root and please correlate clinically as to whether this  patient has a left C7 radiculopathy. The remainder of the MRI of the  cervical spine  is within normal limits. If the patient has a left C7  radiculopathy consider a myelogram post myelogram CT for a more complete  assessment.     MRI OF THE THORACIC SPINE TECHNIQUE: Sagittal T1, proton density and  fat-suppressed T2 weighted images were obtained of the thoracic spine.  In addition axial T1 and T2 weighted images were obtained from C7-T8 and  then from T7-L1.     COMPARISON: There are no prior thoracic spine imaging studies for  comparison.     FINDINGS: The thoracic cord is normal in signal intensity. The conus  terminates at the L1-2 interspace level which is normal.     At C7-T1, T1-T2, T2-T3, T3-T4, T4-T5, T5-T6 and T6-T7, posterior disc  margin and facets are normal with no canal or foraminal narrowing from  C7-T7.     At T7-8 there is a small left paracentral disc protrusion that indents  the ventral thecal sac, comes close to the ventral surface of the cord  but results in essentially no canal narrowing and there is no foraminal  narrowing.     At T8-9 there is a posterior central disc protrusion that indents the  ventral thecal sac, comes close to the ventral surface of the cord but  only results in minimal canal narrowing and there is no foraminal  narrowing.     At T9-10 there is minimal posterior disc bulge, the facets are normal,  there is no canal or foraminal narrowing.     At T10-11, T11-12 and T12-L1, the posterior disc margins and facets are  normal with no canal or foraminal narrowing.     IMPRESSION:  1. At T7-8 and T8-9 there are small posterior central to left  paracentral disc protrusions or herniations that minimally indent the  ventral thecal sac, result in minimal if any canal narrowing. At T9-10,  there is a minimal posterior disc bulge with no associated canal  narrowing. The remainder of the thoracic spine MRI is within normal  limits.     The results of this study were communicated to Moon from [facility] by telephone on 12/30/2024 at 5:20 pm.     This  report was finalized on 12/31/2024 12:03 PM by Dr. Elia Madrid M.D  on Workstation: [id number]               I have reviewed the medications.     Discharge Medications        New Medications        Instructions Start Date   dexAMETHasone 1.5 MG tablet  Commonly known as: DECADRON   3 bid x 4d, 3 q AM and 2 q PM x 1d, 2 bid x 3d, 2 q AM and 1 q PM x 1d, 1 bid x 3d, 1 q AM until gone      famotidine 20 MG tablet  Commonly known as: Pepcid   20 mg, Oral, 2 Times Daily             Continue These Medications        Instructions Start Date   cyclobenzaprine 10 MG tablet  Commonly known as: FLEXERIL   10 mg, Oral, 3 Times Daily PRN      Diclofenac Sodium 1 % gel gel  Commonly known as: VOLTAREN   2 g, Topical, 3 Times Daily PRN      methylphenidate 27 MG CR tablet  Commonly known as: Concerta   27 mg, Oral, Every Morning             Stop These Medications      predniSONE 10 MG (21) dose pack  Commonly known as: DELTASONE             ---------------------------------------------------------------------------------------------  Assessment & Plan   Assessment/Problem List    Neck pain    Plan:    Neck/upper back pain with left arm pain/numbness  -Cervical x-ray on 12/23/2024 negative for acute fractures  -MRI cervical/thoracic spine shows strong suggestion of a left foraminal disc protrusion or small disc herniation that narrows the left neural foramen and could affect the exiting left C7 nerve root the remainder of the MRI of the cervical spine is within normal limits, if the patient has a left C7 radiculopathy consider myelogram post myelogram C7 for more complete assessment  -Neurosurgery consulted  -Symptoms improved with IV steroids  -Continue p.o. steroids, neurosurgery has prescribed  -Continue muscle relaxers  -Hydrocodone as needed  -Ambulatory referral placed for PT  -Contact Dr. Foster' office for continued symptoms for possible outpatient epidural injection  -Neurosurgery to arrange for  follow-up    Leukocytosis  -Likely due to steroid exposure  -Follow-up with your primary care doctor for repeat labs     LARRY on CPAP  -Okay to use home machine or supplemental O2 as needed     Obesity  -BMI 46.38, diet and exercise encouraged     History of hypothyroidism  -Patient reports that he was taken off of his thyroid meds and this issue had resolved on its own     Disposition: Home    Follow-up after Discharge: Primary care and neurosurgery    This note will serve as a discharge summary    Cassie Beasley, APRN 12/31/24 14:06 EST    I have worn appropriate PPE during this patient encounter, sanitized my hands both with entering and exiting patient's room.    32 minutes has been spent by [facility] providers in the care of this patient while under observation status

## 2025-01-01 NOTE — CASE MANAGEMENT/SOCIAL WORK
Case Management Discharge Note      Final Note: Home, family to transport         Selected Continued Care - Discharged on 12/31/2024 Admission date: 12/30/2024 - Discharge disposition: Home or Self Care      Destination    No services have been selected for the patient.                Durable Medical Equipment    No services have been selected for the patient.                Dialysis/Infusion    No services have been selected for the patient.                Home Medical Care    No services have been selected for the patient.                Therapy    No services have been selected for the patient.                Community Resources    No services have been selected for the patient.                Community & DME    No services have been selected for the patient.                    Transportation Services  Private: Car    Final Discharge Disposition Code: 01 - home or self-care

## 2025-01-02 ENCOUNTER — TRANSITIONAL CARE MANAGEMENT TELEPHONE ENCOUNTER (OUTPATIENT)
Dept: CALL CENTER | Facility: HOSPITAL | Age: 45
End: 2025-01-02
Payer: COMMERCIAL

## 2025-01-02 NOTE — OUTREACH NOTE
Call Center TCM Note      Flowsheet Row Responses   [facility] patient discharged from? [facility]   Does the patient have one of the following disease processes/diagnoses(primary or secondary)? Other   TCM attempt successful? Yes   Call start time 0900   Call end time 0905   Discharge diagnosis Neck pain   Person spoke with today (if not patient) and relationship pt   Meds reviewed with patient/caregiver? Yes   Is the patient having any side effects they believe may be caused by any medication additions or changes? No   Does the patient have all medications ordered at discharge? No   What is keeping the patient from filling the prescriptions? --  [Out of stock at pharmacy,  will be in today, AM]   Nursing Interventions No intervention needed   Is the patient taking all medications as directed (includes completed medication regime)? N/A   Comments Advised to make an appt with Neuro Surg   Does the patient have an appointment with their PCP within 7-14 days of discharge? Yes  [1/3/2025  7:45 AM]   Psychosocial issues? No   Did the patient receive a copy of their discharge instructions? Yes   Nursing interventions Reviewed instructions with patient   What is the patient's perception of their health status since discharge? Improving   Is the patient/caregiver able to teach back signs and symptoms related to disease process for when to call PCP? Yes   Is the patient/caregiver able to teach back signs and symptoms related to disease process for when to call 911? Yes   Is the patient/caregiver able to teach back the hierarchy of who to call/visit for symptoms/problems? PCP, Specialist, Home health nurse, Urgent Care, ED, 911 Yes   If the patient is a current smoker, are they able to teach back resources for cessation? Not a smoker   TCM call completed? Yes   Wrap up additional comments Pt reports ongoing neck pain. Reviewed AVS/meds with pt, and pt will  dexamethasone today at pharmacy. Pt verified PCP fu  appt, and advised to make an appt with Neuro Surg.   Call end time 0905   Would this patient benefit from a Referral to [facility] Social Work? No   Is the patient interested in additional calls from an ambulatory ? No            Susana Vasquez RN    1/2/2025, 09:07 EST

## 2025-01-03 ENCOUNTER — OFFICE VISIT (OUTPATIENT)
Dept: FAMILY MEDICINE CLINIC | Facility: CLINIC | Age: 45
End: 2025-01-03
Payer: COMMERCIAL

## 2025-01-03 VITALS
HEIGHT: 68 IN | WEIGHT: 293 LBS | HEART RATE: 87 BPM | OXYGEN SATURATION: 97 % | RESPIRATION RATE: 20 BRPM | BODY MASS INDEX: 44.41 KG/M2 | DIASTOLIC BLOOD PRESSURE: 98 MMHG | SYSTOLIC BLOOD PRESSURE: 160 MMHG

## 2025-01-03 DIAGNOSIS — M54.2 NECK PAIN: ICD-10-CM

## 2025-01-03 DIAGNOSIS — Z92.89 HISTORY OF RECENT HOSPITALIZATION: Primary | ICD-10-CM

## 2025-01-03 PROCEDURE — 99495 TRANSJ CARE MGMT MOD F2F 14D: CPT | Performed by: NURSE PRACTITIONER

## 2025-01-03 NOTE — PROGRESS NOTES
Transitional Care Follow Up Visit  Subjective     Greg Adkins is a 44 y.o. male who presents for a transitional care management visit.    Within 48 business hours after discharge our office contacted him via telephone to coordinate his care and needs.      I reviewed and discussed the details of that call along with the discharge summary, hospital problems, inpatient lab results, inpatient diagnostic studies, and consultation reports with Greg.     Current outpatient and discharge medications have been reconciled for the patient.  Reviewed by: KALI Mccullough          12/31/2024     4:36 PM   Date of TCM Phone Call   [facility]   Date of Admission 12/30/2024   Date of Discharge 12/31/2024   Discharge Disposition Home or Self Care     Risk for Readmission (LACE) Score: 2 (12/31/2024  6:00 AM)      History of Present Illness   Course During Hospital Stay:  1 day       The patient is a 44-year-old male who presents for a hospital follow-up.    He reports an improvement in his neck condition. He has been scheduled for an appointment with Dr. Adams but expresses reluctance towards receiving injections. He has not yet initiated physical therapy as he has not been contacted by the provider. He began a new steroid regimen last night, which required him to [facility] due to unavailability at his local pharmacy. He has been using a new pillow that provides head support, allowing him to sleep on his back. He experiences difficulty in turning his head, particularly to the right, and feels resistance when looking upwards. He also reports a sensation of constant muscle contraction in his forearm. He has been informed that his C6 and C7 vertebrae are impinging on a nerve cluster, causing numbness in two of his fingers, although he retains some sensation. He has been experiencing back pain for approximately 3 weeks. He occasionally takes hydrocodone for severe pain. He has increased his water  "intake and uses flavoring agents.    Supplemental Information  He has a history of carpal tunnel syndrome.    MEDICATIONS  Current: Hydrocodone.        The following portions of the patient's history were reviewed and updated as appropriate: allergies, current medications, past family history, past medical history, past social history, past surgical history, and problem list.    Review of Systems   Constitutional: Negative.  Negative for appetite change, chills, fatigue, fever and unexpected weight change.   HENT: Negative.     Eyes: Negative.    Respiratory:  Negative for cough, chest tightness, shortness of breath and wheezing.    Cardiovascular:  Negative for chest pain, palpitations and leg swelling.   Gastrointestinal: Negative.  Negative for blood in stool.   Endocrine: Negative.    Genitourinary: Negative.    Musculoskeletal:  Positive for neck pain and neck stiffness.   Skin: Negative.    Allergic/Immunologic: Negative.    Neurological: Negative.  Negative for dizziness, weakness, numbness and headaches.   Hematological: Negative.  Does not bruise/bleed easily.   Psychiatric/Behavioral: Negative.         Objective   /98   Pulse 87   Resp 20   Ht 172.7 cm (68\")   Wt 133 kg (293 lb)   SpO2 97%   BMI 44.55 kg/m²   Physical Exam  Vitals and nursing note reviewed.   Constitutional:       Appearance: He is well-developed.   HENT:      Head: Normocephalic and atraumatic.   Eyes:      Conjunctiva/sclera: Conjunctivae normal.      Pupils: Pupils are equal, round, and reactive to light.   Cardiovascular:      Rate and Rhythm: Normal rate and regular rhythm.      Heart sounds: Normal heart sounds. No murmur heard.  Pulmonary:      Effort: Pulmonary effort is normal.      Breath sounds: Normal breath sounds.   Musculoskeletal:      Cervical back: Pain with movement present. Decreased range of motion.   Neurological:      Mental Status: He is alert and oriented to person, place, and time.   Psychiatric:       "   Behavior: Behavior normal.         Thought Content: Thought content normal.         Judgment: Judgment normal.         Assessment & Plan          1. Post-hospitalization follow-up.  His condition has shown slight improvement. He has been advised to avoid opioid use whenever possible. The steroid medication should be taken with food to prevent potential stomach irritation. He has been informed that the steroid will help with inflammation and pain. A referral for physical therapy has been initiated, and he has been instructed to check with the  regarding the status of this referral. If he does not receive any communication from the physical therapy department, he should send a message via [employer] to either Dr. Recio or myself.            Patient or patient representative verbalized consent for the use of Ambient Listening during the visit with  KALI Mccullough for chart documentation. 1/3/2025  08:09 EST

## 2025-01-03 NOTE — PROGRESS NOTES
"Transitional Care Follow Up Visit  Subjective     Greg Adkins is a 44 y.o. male who presents for a transitional care management visit.    Within 48 business hours after discharge our office contacted him via telephone to coordinate his care and needs.      I reviewed and discussed the details of that call along with the discharge summary, hospital problems, inpatient lab results, inpatient diagnostic studies, and consultation reports with Greg.     Current outpatient and discharge medications have been reconciled for the patient.  Reviewed by: Orquidea Srinivasan MA          12/31/2024     4:36 PM   Date of TCM Phone Call   [facility]   Date of Admission 12/30/2024   Date of Discharge 12/31/2024   Discharge Disposition Home or Self Care     Risk for Readmission (LACE) Score: 2 (12/31/2024  6:00 AM)      History of Present Illness   Course During Hospital Stay:  ***     {Common H&P Review Areas:57046}    Review of Systems    Objective   /98   Pulse 87   Resp 20   Ht 172.7 cm (68\")   Wt 133 kg (293 lb)   SpO2 97%   BMI 44.55 kg/m²   Physical Exam    Assessment & Plan   {Assess/PlanSmartLinks:85913}               "

## 2025-02-20 ENCOUNTER — OFFICE VISIT (OUTPATIENT)
Dept: FAMILY MEDICINE CLINIC | Facility: CLINIC | Age: 45
End: 2025-02-20
Payer: COMMERCIAL

## 2025-02-20 VITALS
HEIGHT: 68 IN | RESPIRATION RATE: 16 BRPM | OXYGEN SATURATION: 99 % | WEIGHT: 305.1 LBS | DIASTOLIC BLOOD PRESSURE: 100 MMHG | BODY MASS INDEX: 46.24 KG/M2 | HEART RATE: 90 BPM | SYSTOLIC BLOOD PRESSURE: 138 MMHG

## 2025-02-20 DIAGNOSIS — F90.9 ADULT ADHD: ICD-10-CM

## 2025-02-20 PROCEDURE — 99213 OFFICE O/P EST LOW 20 MIN: CPT | Performed by: FAMILY MEDICINE

## 2025-02-20 RX ORDER — METHYLPHENIDATE HYDROCHLORIDE 27 MG/1
27 TABLET ORAL EVERY MORNING
Qty: 90 TABLET | Refills: 0 | Status: SHIPPED | OUTPATIENT
Start: 2025-02-20

## 2025-02-20 NOTE — PROGRESS NOTES
"Chief Complaint  ADHD and Back Pain (Bulging discs)    Subjective    History of Present Illness  History of Present Illness  Here today for follow-up as above. He is currently on methylphenidate and reports satisfactory sleep and appetite. He believes the dosage of methylphenidate is appropriate for him. However, he notes that he forgot to take his medication today, which has resulted in a slight disorganization of his thoughts. Due for refill today. PDMP is reviewed and appropriate.    Has some ongoing back pain, working with physical therapy with some improvements. No need for any further interventions at this time.    Objective     Vital Signs:   /100   Pulse 90   Resp 16   Ht 172.7 cm (68\")   Wt (!) 138 kg (305 lb 1.6 oz)   SpO2 99%   BMI 46.39 kg/m²   Physical Exam  Vitals and nursing note reviewed.   Constitutional:       General: He is not in acute distress.     Appearance: Normal appearance. He is not ill-appearing.   Cardiovascular:      Rate and Rhythm: Normal rate and regular rhythm.      Pulses: Normal pulses.      Heart sounds: Normal heart sounds. No murmur heard.  Pulmonary:      Effort: Pulmonary effort is normal. No respiratory distress.      Breath sounds: Normal breath sounds. No rales.   Neurological:      Mental Status: He is alert and oriented to person, place, and time. Mental status is at baseline.   Psychiatric:         Mood and Affect: Mood normal.         Behavior: Behavior normal.                     Assessment and Plan   Diagnoses and all orders for this visit:    1. Adult ADHD  -     methylphenidate (Concerta) 27 MG CR tablet; Take 1 tablet by mouth Every Morning  Dispense: 90 tablet; Refill: 0      Assessment & Plan  1. ADHD.  A prescription for methylphenidate will be renewed today.    Recommended follow up as below. Encouraged communication via [employer] in the meantime.     Patient was given instructions and counseling regarding his condition or for health maintenance " advice. Please see specific information pulled into the AVS (placed there by myself) if appropriate.    Return in 6 months (on 8/20/2025), or if symptoms worsen or fail to improve, for f/u ADHD.    Patient or patient representative verbalized consent for the use of Ambient Listening during the visit with  Nathen Recio MD for chart documentation. 2/20/2025  08:57 ANTHONY Recio MD

## 2025-03-10 DIAGNOSIS — F90.9 ADULT ADHD: ICD-10-CM

## 2025-03-10 RX ORDER — METHYLPHENIDATE HYDROCHLORIDE 27 MG/1
27 TABLET ORAL EVERY MORNING
Qty: 90 TABLET | Refills: 0 | Status: SHIPPED | OUTPATIENT
Start: 2025-03-10

## 2025-06-24 DIAGNOSIS — F90.9 ADULT ADHD: ICD-10-CM

## 2025-06-24 RX ORDER — METHYLPHENIDATE HYDROCHLORIDE 27 MG/1
27 TABLET ORAL EVERY MORNING
Qty: 90 TABLET | Refills: 0 | Status: SHIPPED | OUTPATIENT
Start: 2025-06-24